# Patient Record
Sex: FEMALE | Race: WHITE | Employment: OTHER | URBAN - METROPOLITAN AREA
[De-identification: names, ages, dates, MRNs, and addresses within clinical notes are randomized per-mention and may not be internally consistent; named-entity substitution may affect disease eponyms.]

---

## 2018-06-07 ENCOUNTER — HOSPITAL PATHOLOGY (OUTPATIENT)
Dept: OTHER | Facility: CLINIC | Age: 33
End: 2018-06-07

## 2018-06-07 ENCOUNTER — HOSPITAL LABORATORY (OUTPATIENT)
Dept: OTHER | Facility: CLINIC | Age: 33
End: 2018-06-07

## 2018-06-09 ENCOUNTER — APPOINTMENT (OUTPATIENT)
Dept: ULTRASOUND IMAGING | Facility: CLINIC | Age: 33
End: 2018-06-09
Attending: EMERGENCY MEDICINE
Payer: COMMERCIAL

## 2018-06-09 ENCOUNTER — HOSPITAL ENCOUNTER (EMERGENCY)
Facility: CLINIC | Age: 33
Discharge: HOME | End: 2018-06-09
Attending: EMERGENCY MEDICINE | Admitting: EMERGENCY MEDICINE
Payer: COMMERCIAL

## 2018-06-09 VITALS
DIASTOLIC BLOOD PRESSURE: 72 MMHG | OXYGEN SATURATION: 100 % | HEART RATE: 78 BPM | HEIGHT: 67 IN | WEIGHT: 184 LBS | RESPIRATION RATE: 16 BRPM | BODY MASS INDEX: 28.88 KG/M2 | TEMPERATURE: 98.7 F | SYSTOLIC BLOOD PRESSURE: 120 MMHG

## 2018-06-09 DIAGNOSIS — I80.9 SUPERFICIAL THROMBOPHLEBITIS, INVOLVING UNSPECIFIED SITE: ICD-10-CM

## 2018-06-09 PROCEDURE — 99284 EMERGENCY DEPT VISIT MOD MDM: CPT | Mod: 25

## 2018-06-09 PROCEDURE — 93971 EXTREMITY STUDY: CPT | Mod: LT

## 2018-06-09 RX ORDER — IBUPROFEN 600 MG/1
600 TABLET, FILM COATED ORAL EVERY 6 HOURS PRN
Qty: 30 TABLET | Refills: 1 | Status: ON HOLD | OUTPATIENT
Start: 2018-06-09 | End: 2019-05-13

## 2018-06-09 ASSESSMENT — ENCOUNTER SYMPTOMS: MYALGIAS: 1

## 2018-06-09 NOTE — DISCHARGE INSTRUCTIONS
Your ultrasound was negative for DVT.  Use warm compresses anti-inflammatories and a low-dose of aspirin.  Please follow-up with primary care return with redness or swelling of the entire calf.

## 2018-06-09 NOTE — ED AVS SNAPSHOT
Emergency Department    [address] [facility] [address]    Phone:  [phone]    Fax:  [phone]                                       Mallika Ojeda   MRN: [id number]    Department:   Emergency Department   Date of Visit:  6/9/2018           After Visit Summary Signature Page     I have received my discharge instructions, and my questions have been answered. I have discussed any challenges I see with this plan with the nurse or doctor.    ..........................................................................................................................................  Patient/Patient Representative Signature      ..........................................................................................................................................  Patient Representative Print Name and Relationship to Patient    ..................................................               ................................................  Date                                            Time    ..........................................................................................................................................  Reviewed by Signature/Title    ...................................................              ..............................................  Date                                                            Time

## 2018-06-09 NOTE — ED PROVIDER NOTES
"  History     Chief Complaint:  Leg Pain (Sent here  by her OB do rule out a DVT in left leg)      HPI   Mallika Ojeda is a 32 year old female who presents with leg pain. The patient had a DNC following a miscarriage on Thursday, two days ago. Since then she developed pain in the left posterior lower leg, near the distal aspect of the calf. The patient called her OB, who sent her here to rule out a DVT in the left leg. The patient has a history of varicose vein removal.      Allergies:  Sulfa Drugs    Medications:    The patient is currently on no regular medications.      Past Medical History:    The patient denies any significant past medical history.    Past Surgical History:    The patient does not have any pertinent past surgical history  Family History:    No past pertinent family history.     Social History:  The patient denies tobacco or alcohol use.  Marital Status:   [2]       Review of Systems   Cardiovascular: Negative for leg swelling.   Musculoskeletal: Positive for myalgias.   All other systems reviewed and are negative.        Physical Exam   First Vitals:  BP: 120/72  Pulse: 78  Temp: 98.7  F (37.1  C)  Resp: 16  Height: 170.2 cm (5' 7\")  Weight: 83.5 kg (184 lb)  SpO2: 100 %      Physical Exam   Constitutional: She appears well-developed.   Cardiovascular: Normal rate.    Pulmonary/Chest: Effort normal.   Musculoskeletal:        Legs:  Nursing note and vitals reviewed.        Emergency Department Course     Imaging:  Radiographic findings were communicated with the patient who voiced understanding of the findings.    US Lower Extremity Venous Duplex, Left:  No evidence of deep venous thrombosis. As per radiology.       Emergency Department Course:  Nursing notes and vitals reviewed.     0901 I performed an exam of the patient as documented above.     The patient was sent for a left lower extremity US while in the emergency department, findings above.     1040 I rechecked the patient and " discussed the results of her workup thus far.     Findings and plan explained to the patient. Patient discharged home with instructions regarding supportive care, medications, and reasons to return. The importance of close follow-up was reviewed. The patient was prescribed aspirin and ibuprofen.    I personally reviewed the laboratory results with the patient and answered all related questions prior to discharge.         Impression & Plan      Medical Decision Making:  Mallika Ojeda is a 32 year old female who presents for evaluation of a tender lump on the left  leg.  This is consistent with superficial thrombophlebitis. I do not think at this point it represents an infected thrombophlebitis.  Discussed warm compresses and aspirin therapy.  Ultrasound is reassuring as there is no DVT.  No signs of pulmonary embolism by history or physical exam.  Patient offered reassurance aspirin and follow-up with primary care.      Diagnosis:    ICD-10-CM   1. Superficial thrombophlebitis, involving unspecified site I80.9       Disposition:  Discharged to home.    Discharge Medications:  New Prescriptions    ASPIRIN 81 MG TABLET    Take 2 tablets (162 mg) by mouth daily for 14 days    IBUPROFEN (ADVIL/MOTRIN) 600 MG TABLET    Take 1 tablet (600 mg) by mouth every 6 hours as needed for moderate pain     I, Yoan Long, am serving as a scribe on 6/9/2018 at 9:26 AM to personally document services performed by Micheal Caputo MD based on my observations and the provider's statements to me.       Yoan Long  6/9/2018    EMERGENCY DEPARTMENT       Micheal Caputo MD  06/14/18 [id number]

## 2018-06-09 NOTE — ED AVS SNAPSHOT
Emergency Department    [address] [facility] [address]    Phone:  [phone]    Fax:  [phone]                                       Mallika Ojeda   MRN: [id number]    Department:   Emergency Department   Date of Visit:  6/9/2018           Patient Information     Date Of Birth          1985        Your diagnoses for this visit were:     Superficial thrombophlebitis, involving unspecified site        You were seen by Micheal Caputo MD.      Follow-up Information     Follow up with Juana Chawla MD In 1 week.    Specialty:  Family Practice    Why:  As needed    Contact information:    [employer]  [address]  [facility] [address] [phone]          Discharge Instructions       Your ultrasound was negative for DVT.  Use warm compresses anti-inflammatories and a low-dose of aspirin.  Please follow-up with primary care return with redness or swelling of the entire calf.    Discharge References/Attachments     THROMBOPHLEBITIS, SUPERFICIAL (ENGLISH)      24 Hour Appointment Hotline       To make an appointment at any [facility], call [phone] ([phone]). If you don't have a family doctor or clinic, we will help you find one. [facility] are conveniently located to serve the needs of you and your family.             Review of your medicines      START taking        Dose / Directions Last dose taken    aspirin 81 MG tablet   Dose:  162 mg   Quantity:  28 tablet        Take 2 tablets (162 mg) by mouth daily for 14 days   Refills:  0        ibuprofen 600 MG tablet   Commonly known as:  ADVIL/MOTRIN   Dose:  600 mg   Quantity:  30 tablet        Take 1 tablet (600 mg) by mouth every 6 hours as needed for moderate pain   Refills:  1                Prescriptions were sent or printed at these locations (2 Prescriptions)                   Other Prescriptions                Printed at Department/Unit printer (2 of 2)         aspirin 81 MG tablet                ibuprofen (ADVIL/MOTRIN) 600 MG tablet                Procedures and tests performed during your visit     US Lower Extremity Venous Duplex Left      Orders Needing Specimen Collection     None      Pending Results     Date and Time Order Name Status Description    6/7/2018 1524 SURGICAL PATHOLOGY EXAM In process             Pending Culture Results     Date and Time Order Name Status Description    6/7/2018 1524 SURGICAL PATHOLOGY EXAM In process             Pending Results Instructions     If you had any lab results that were not finalized at the time of your Discharge, you can call the ED Lab Result RN at [phone]. You will be contacted by this team for any positive Lab results or changes in treatment. The nurses are available 7 days a week from 10A to 6:30P.  You can leave a message 24 hours per day and they will return your call.        Test Results From Your Hospital Stay        6/9/2018 10:46 AM      Narrative     VENOUS ULTRASOUND LEFT LEG  6/9/2018 10:33 AM     HISTORY: Superficial thrombophlebitis. Evaluate for deep venous  thrombosis.      COMPARISON: None.    FINDINGS: Examination of the deep veins with graded compression and  color flow Doppler with spectral wave form analysis shows no evidence  of thrombus in the common femoral vein, femoral vein, popliteal vein  or calf veins. There is no venous insufficiency.        Impression     IMPRESSION: No evidence of deep venous thrombosis.    FRAN NIÑO MD                Clinical Quality Measure: Blood Pressure Screening     Your blood pressure was checked while you were in the emergency department today. The last reading we obtained was  BP: 120/72 . Please read the guidelines below about what these numbers mean and what you should do about them.  If your systolic blood pressure (the top number) is less than 120 and your diastolic blood pressure (the bottom number) is less than 80, then your blood pressure is normal. There is nothing more that  "you need to do about it.  If your systolic blood pressure (the top number) is 120-139 or your diastolic blood pressure (the bottom number) is 80-89, your blood pressure may be higher than it should be. You should have your blood pressure rechecked within a year by a primary care provider.  If your systolic blood pressure (the top number) is 140 or greater or your diastolic blood pressure (the bottom number) is 90 or greater, you may have high blood pressure. High blood pressure is treatable, but if left untreated over time it can put you at risk for heart attack, stroke, or kidney failure. You should have your blood pressure rechecked by a primary care provider within the next 4 weeks.  If your provider in the emergency department today gave you specific instructions to follow-up with your doctor or provider even sooner than that, you should follow that instruction and not wait for up to 4 weeks for your follow-up visit.        Thank you for choosing [facility]       Thank you for choosing [facility] for your care. Our goal is always to provide you with excellent care. Hearing back from our patients is one way we can continue to improve our services. Please take a few minutes to complete the written survey that you may receive in the mail after you visit with us. Thank you!        [employer] Information     [employer] lets you send messages to your doctor, view your test results, renew your prescriptions, schedule appointments and more. To sign up, go to [email or web address] . Click on \"Log in\" on the left side of the screen, which will take you to the Welcome page. Then click on \"Sign up Now\" on the right side of the page.     You will be asked to enter the access code listed below, as well as some personal information. Please follow the directions to create your username and password.     Your access code is: [email or web address]  Expires: 2018 10:55 AM     Your access code will  in 90 days. If you need help or a new " code, please call your [place] clinic or [phone].        Care EveryWhere ID     This is your Care EveryWhere ID. This could be used by other organizations to access your [facility] medical records  [id number]        Equal Access to Services     BUZZ TALBOT : Brea Benton, wamaguida luqadaha, qaybta kaalmada sophie, marce nicholson. So [facility] [phone].    ATENCIÓN: Si habla español, tiene a roblero disposición servicios gratuitos de asistencia lingüística. Llame al [phone].    We comply with applicable federal civil rights laws and [place] laws. We do not discriminate on the basis of race, color, national origin, age, disability, sex, sexual orientation, or gender identity.            After Visit Summary       This is your record. Keep this with you and show to your community pharmacist(s) and doctor(s) at your next visit.

## 2018-06-11 ENCOUNTER — HEALTH MAINTENANCE LETTER (OUTPATIENT)
Age: 33
End: 2018-06-11

## 2018-06-28 LAB
DNA INDEX SPEC FC-ACNC: 1
DNA PLOIDY SPEC FC: NORMAL
PATHOLOGY STUDY: NORMAL
SPECIMEN SOURCE: NORMAL

## 2018-08-16 LAB — COPATH REPORT: NORMAL

## 2018-10-08 LAB
BLD GP AB SCN SERPL QL: NEGATIVE
HBV SURFACE AG SERPL QL IA: NEGATIVE
HIV 1+2 AB+HIV1 P24 AG SERPL QL IA: NORMAL
RUBELLA ABY IGG: NORMAL
RUBELLA ANTIBODY IGG QUANTITATIVE: <0.9 IU/ML
TREPONEMA ANTIBODIES: NORMAL

## 2019-04-08 LAB — GROUP B STREP PCR: POSITIVE

## 2019-05-13 ENCOUNTER — HOSPITAL ENCOUNTER (INPATIENT)
Facility: CLINIC | Age: 34
LOS: 3 days | Discharge: HOME | End: 2019-05-16
Attending: OBSTETRICS & GYNECOLOGY | Admitting: OBSTETRICS & GYNECOLOGY
Payer: COMMERCIAL

## 2019-05-13 DIAGNOSIS — K64.0 GRADE I HEMORRHOIDS: Primary | ICD-10-CM

## 2019-05-13 LAB
ABO + RH BLD: NORMAL
ABO + RH BLD: NORMAL
SPECIMEN EXP DATE BLD: NORMAL

## 2019-05-13 PROCEDURE — 86901 BLOOD TYPING SEROLOGIC RH(D): CPT | Performed by: OBSTETRICS & GYNECOLOGY

## 2019-05-13 PROCEDURE — 3E033VJ INTRODUCTION OF OTHER HORMONE INTO PERIPHERAL VEIN, PERCUTANEOUS APPROACH: ICD-10-PCS | Performed by: OBSTETRICS & GYNECOLOGY

## 2019-05-13 PROCEDURE — 86780 TREPONEMA PALLIDUM: CPT | Performed by: OBSTETRICS & GYNECOLOGY

## 2019-05-13 PROCEDURE — 86900 BLOOD TYPING SEROLOGIC ABO: CPT | Performed by: OBSTETRICS & GYNECOLOGY

## 2019-05-13 PROCEDURE — 36415 COLL VENOUS BLD VENIPUNCTURE: CPT | Performed by: OBSTETRICS & GYNECOLOGY

## 2019-05-13 PROCEDURE — 25000125 ZZHC RX 250: Performed by: OBSTETRICS & GYNECOLOGY

## 2019-05-13 PROCEDURE — 25800030 ZZH RX IP 258 OP 636: Performed by: OBSTETRICS & GYNECOLOGY

## 2019-05-13 PROCEDURE — 12000000 ZZH R&B MED SURG/OB

## 2019-05-13 RX ORDER — OXYTOCIN 10 [USP'U]/ML
10 INJECTION, SOLUTION INTRAMUSCULAR; INTRAVENOUS
Status: DISCONTINUED | OUTPATIENT
Start: 2019-05-13 | End: 2019-05-14

## 2019-05-13 RX ORDER — PENICILLIN G POTASSIUM 5000000 [IU]/1
5 INJECTION, POWDER, FOR SOLUTION INTRAMUSCULAR; INTRAVENOUS ONCE
Status: COMPLETED | OUTPATIENT
Start: 2019-05-13 | End: 2019-05-14

## 2019-05-13 RX ORDER — CLINDAMYCIN PHOSPHATE 900 MG/50ML
900 INJECTION, SOLUTION INTRAVENOUS EVERY 8 HOURS
Status: DISCONTINUED | OUTPATIENT
Start: 2019-05-14 | End: 2019-05-13

## 2019-05-13 RX ORDER — LIDOCAINE 40 MG/G
CREAM TOPICAL
Status: DISCONTINUED | OUTPATIENT
Start: 2019-05-13 | End: 2019-05-14

## 2019-05-13 RX ORDER — OXYCODONE AND ACETAMINOPHEN 5; 325 MG/1; MG/1
1 TABLET ORAL
Status: DISCONTINUED | OUTPATIENT
Start: 2019-05-13 | End: 2019-05-14

## 2019-05-13 RX ORDER — TERBUTALINE SULFATE 1 MG/ML
0.25 INJECTION, SOLUTION SUBCUTANEOUS
Status: DISCONTINUED | OUTPATIENT
Start: 2019-05-13 | End: 2019-05-13

## 2019-05-13 RX ORDER — SODIUM CHLORIDE, SODIUM LACTATE, POTASSIUM CHLORIDE, CALCIUM CHLORIDE 600; 310; 30; 20 MG/100ML; MG/100ML; MG/100ML; MG/100ML
INJECTION, SOLUTION INTRAVENOUS CONTINUOUS
Status: DISCONTINUED | OUTPATIENT
Start: 2019-05-13 | End: 2019-05-14

## 2019-05-13 RX ORDER — ONDANSETRON 2 MG/ML
4 INJECTION INTRAMUSCULAR; INTRAVENOUS EVERY 6 HOURS PRN
Status: DISCONTINUED | OUTPATIENT
Start: 2019-05-13 | End: 2019-05-14

## 2019-05-13 RX ORDER — NALOXONE HYDROCHLORIDE 0.4 MG/ML
.1-.4 INJECTION, SOLUTION INTRAMUSCULAR; INTRAVENOUS; SUBCUTANEOUS
Status: DISCONTINUED | OUTPATIENT
Start: 2019-05-13 | End: 2019-05-14

## 2019-05-13 RX ORDER — IBUPROFEN 400 MG/1
800 TABLET, FILM COATED ORAL
Status: DISCONTINUED | OUTPATIENT
Start: 2019-05-13 | End: 2019-05-14

## 2019-05-13 RX ORDER — METHYLERGONOVINE MALEATE 0.2 MG/ML
200 INJECTION INTRAVENOUS
Status: DISCONTINUED | OUTPATIENT
Start: 2019-05-13 | End: 2019-05-14

## 2019-05-13 RX ORDER — OXYTOCIN/0.9 % SODIUM CHLORIDE 30/500 ML
100-340 PLASTIC BAG, INJECTION (ML) INTRAVENOUS CONTINUOUS PRN
Status: COMPLETED | OUTPATIENT
Start: 2019-05-13 | End: 2019-05-14

## 2019-05-13 RX ORDER — FENTANYL CITRATE 50 UG/ML
50-100 INJECTION, SOLUTION INTRAMUSCULAR; INTRAVENOUS
Status: DISCONTINUED | OUTPATIENT
Start: 2019-05-13 | End: 2019-05-14

## 2019-05-13 RX ORDER — OXYTOCIN/0.9 % SODIUM CHLORIDE 30/500 ML
1-24 PLASTIC BAG, INJECTION (ML) INTRAVENOUS CONTINUOUS
Status: DISCONTINUED | OUTPATIENT
Start: 2019-05-13 | End: 2019-05-14

## 2019-05-13 RX ORDER — VITAMIN A ACETATE, .BETA.-CAROTENE, ASCORBIC ACID, CHOLECALCIFEROL, .ALPHA.-TOCOPHEROL ACETATE, DL-, THIAMINE MONONITRATE, RIBOFLAVIN, NIACINAMIDE, PYRIDOXINE HYDROCHLORIDE, FOLIC ACID, CYANOCOBALAMIN, CALCIUM CARBONATE, FERROUS FUMARATE, ZINC OXIDE, AND CUPRIC OXIDE 2000; 2000; 120; 400; 22; 1.84; 3; 20; 10; 1; 12; 200; 27; 25; 2 [IU]/1; [IU]/1; MG/1; [IU]/1; MG/1; MG/1; MG/1; MG/1; MG/1; MG/1; UG/1; MG/1; MG/1; MG/1; MG/1
1 TABLET ORAL DAILY
Status: ON HOLD | COMMUNITY
End: 2020-12-27

## 2019-05-13 RX ORDER — CARBOPROST TROMETHAMINE 250 UG/ML
250 INJECTION, SOLUTION INTRAMUSCULAR
Status: DISCONTINUED | OUTPATIENT
Start: 2019-05-13 | End: 2019-05-14

## 2019-05-13 RX ORDER — ACETAMINOPHEN 325 MG/1
650 TABLET ORAL EVERY 4 HOURS PRN
Status: DISCONTINUED | OUTPATIENT
Start: 2019-05-13 | End: 2019-05-14

## 2019-05-13 RX ADMIN — SODIUM CHLORIDE, POTASSIUM CHLORIDE, SODIUM LACTATE AND CALCIUM CHLORIDE: 600; 310; 30; 20 INJECTION, SOLUTION INTRAVENOUS at 18:33

## 2019-05-13 RX ADMIN — OXYTOCIN-SODIUM CHLORIDE 0.9% IV SOLN 30 UNIT/500ML 2 MILLI-UNITS/MIN: 30-0.9/5 SOLUTION at 18:40

## 2019-05-13 RX ADMIN — SODIUM CHLORIDE, POTASSIUM CHLORIDE, SODIUM LACTATE AND CALCIUM CHLORIDE: 600; 310; 30; 20 INJECTION, SOLUTION INTRAVENOUS at 23:57

## 2019-05-13 ASSESSMENT — MIFFLIN-ST. JEOR: SCORE: 1704.26

## 2019-05-13 NOTE — H&P
May 13, 2019      32yo  @ 41.0 wga here for post-dates IOL.  Prenatal care has been uncomplicated other than low-lying placenta that resolved on 28 wk US, rubella non-immune, and GBS positive.  There have been no changes in her medical history based upon review of chart, nursing records, and examination.      PNLs: A pos, ABS neg, rubella non-immune, HIV NR, HBsAg neg, RPR NR, GBS POS      There were no vitals taken for this visit.  Gen: NAD, A&O x 3  Abd: gravid, NT, palpated vertex  SVE: closed>> Using a sterile speculum, cervix was visualized and cleaned with betadine.  A Cooks catheter cervical ripening balloon was placed under direct visualization without difficulty. The most distal uterine balloon was inflated with 40 ml of NS and was confirmed supracervical.  The vaginal balloon was inflated with 40 ml of NS.  Pt tolerated placement well.  FHT: cat 1 reactive, baseline 120  TOCO: irregular      A/P: 32yo  @ 41.0 wga here for post-dates IOL.  AVSS.  - unfavorable cervix: Cooks catheter cervical ripening balloon placed without difficulty.  Will combine with low dose pitocin overnight, plan to remove tomorrow ~ 6 AM with likely pitocin per protocol afterwards  - GBS positive, PCN when in labor  - EFW 8 lb        KATHY SHARMA MD

## 2019-05-14 ENCOUNTER — ANESTHESIA EVENT (OUTPATIENT)
Dept: OBGYN | Facility: CLINIC | Age: 34
End: 2019-05-14
Payer: COMMERCIAL

## 2019-05-14 ENCOUNTER — ANESTHESIA (OUTPATIENT)
Dept: OBGYN | Facility: CLINIC | Age: 34
End: 2019-05-14
Payer: COMMERCIAL

## 2019-05-14 LAB — T PALLIDUM AB SER QL: NONREACTIVE

## 2019-05-14 PROCEDURE — 25000128 H RX IP 250 OP 636: Performed by: ANESTHESIOLOGY

## 2019-05-14 PROCEDURE — 37000011 ZZH ANESTHESIA WARD SERVICE

## 2019-05-14 PROCEDURE — 88307 TISSUE EXAM BY PATHOLOGIST: CPT | Mod: 26 | Performed by: OBSTETRICS & GYNECOLOGY

## 2019-05-14 PROCEDURE — 12000035 ZZH R&B POSTPARTUM

## 2019-05-14 PROCEDURE — 88307 TISSUE EXAM BY PATHOLOGIST: CPT | Performed by: OBSTETRICS & GYNECOLOGY

## 2019-05-14 PROCEDURE — 3E0R3BZ INTRODUCTION OF ANESTHETIC AGENT INTO SPINAL CANAL, PERCUTANEOUS APPROACH: ICD-10-PCS | Performed by: ANESTHESIOLOGY

## 2019-05-14 PROCEDURE — 25000128 H RX IP 250 OP 636: Performed by: OBSTETRICS & GYNECOLOGY

## 2019-05-14 PROCEDURE — 25000125 ZZHC RX 250: Performed by: ANESTHESIOLOGY

## 2019-05-14 PROCEDURE — 25800030 ZZH RX IP 258 OP 636: Performed by: OBSTETRICS & GYNECOLOGY

## 2019-05-14 PROCEDURE — 25000125 ZZHC RX 250: Performed by: OBSTETRICS & GYNECOLOGY

## 2019-05-14 PROCEDURE — 0KQM0ZZ REPAIR PERINEUM MUSCLE, OPEN APPROACH: ICD-10-PCS | Performed by: OBSTETRICS & GYNECOLOGY

## 2019-05-14 PROCEDURE — 25000132 ZZH RX MED GY IP 250 OP 250 PS 637: Performed by: OBSTETRICS & GYNECOLOGY

## 2019-05-14 PROCEDURE — 27110038 ZZH RX 271: Performed by: ANESTHESIOLOGY

## 2019-05-14 PROCEDURE — 25000132 ZZH RX MED GY IP 250 OP 250 PS 637

## 2019-05-14 PROCEDURE — 00HU33Z INSERTION OF INFUSION DEVICE INTO SPINAL CANAL, PERCUTANEOUS APPROACH: ICD-10-PCS | Performed by: ANESTHESIOLOGY

## 2019-05-14 PROCEDURE — 72200001 ZZH LABOR CARE VAGINAL DELIVERY SINGLE

## 2019-05-14 RX ORDER — NALOXONE HYDROCHLORIDE 0.4 MG/ML
.1-.4 INJECTION, SOLUTION INTRAMUSCULAR; INTRAVENOUS; SUBCUTANEOUS
Status: DISCONTINUED | OUTPATIENT
Start: 2019-05-14 | End: 2019-05-16 | Stop reason: HOSPADM

## 2019-05-14 RX ORDER — OXYTOCIN 10 [USP'U]/ML
10 INJECTION, SOLUTION INTRAMUSCULAR; INTRAVENOUS
Status: DISCONTINUED | OUTPATIENT
Start: 2019-05-14 | End: 2019-05-16 | Stop reason: HOSPADM

## 2019-05-14 RX ORDER — ACETAMINOPHEN 325 MG/1
975 TABLET ORAL EVERY 6 HOURS
Status: DISCONTINUED | OUTPATIENT
Start: 2019-05-14 | End: 2019-05-14

## 2019-05-14 RX ORDER — OXYTOCIN/0.9 % SODIUM CHLORIDE 30/500 ML
340 PLASTIC BAG, INJECTION (ML) INTRAVENOUS CONTINUOUS PRN
Status: DISCONTINUED | OUTPATIENT
Start: 2019-05-14 | End: 2019-05-16 | Stop reason: HOSPADM

## 2019-05-14 RX ORDER — OXYTOCIN/0.9 % SODIUM CHLORIDE 30/500 ML
100 PLASTIC BAG, INJECTION (ML) INTRAVENOUS CONTINUOUS
Status: DISCONTINUED | OUTPATIENT
Start: 2019-05-14 | End: 2019-05-16 | Stop reason: HOSPADM

## 2019-05-14 RX ORDER — NALOXONE HYDROCHLORIDE 0.4 MG/ML
.1-.4 INJECTION, SOLUTION INTRAMUSCULAR; INTRAVENOUS; SUBCUTANEOUS
Status: DISCONTINUED | OUTPATIENT
Start: 2019-05-14 | End: 2019-05-14

## 2019-05-14 RX ORDER — MISOPROSTOL 200 UG/1
800 TABLET ORAL
Status: COMPLETED | OUTPATIENT
Start: 2019-05-14 | End: 2019-05-14

## 2019-05-14 RX ORDER — LANOLIN 100 %
OINTMENT (GRAM) TOPICAL
Status: DISCONTINUED | OUTPATIENT
Start: 2019-05-14 | End: 2019-05-16 | Stop reason: HOSPADM

## 2019-05-14 RX ORDER — AMPICILLIN 2 G/1
2 INJECTION, POWDER, FOR SOLUTION INTRAVENOUS EVERY 6 HOURS
Status: DISCONTINUED | OUTPATIENT
Start: 2019-05-14 | End: 2019-05-14

## 2019-05-14 RX ORDER — EPHEDRINE SULFATE 50 MG/ML
5 INJECTION, SOLUTION INTRAMUSCULAR; INTRAVENOUS; SUBCUTANEOUS
Status: DISCONTINUED | OUTPATIENT
Start: 2019-05-14 | End: 2019-05-14

## 2019-05-14 RX ORDER — HYDROCORTISONE 2.5 %
CREAM (GRAM) TOPICAL 3 TIMES DAILY PRN
Status: DISCONTINUED | OUTPATIENT
Start: 2019-05-14 | End: 2019-05-16 | Stop reason: HOSPADM

## 2019-05-14 RX ORDER — ACETAMINOPHEN 325 MG/1
TABLET ORAL
Status: COMPLETED
Start: 2019-05-14 | End: 2019-05-14

## 2019-05-14 RX ORDER — LIDOCAINE HYDROCHLORIDE AND EPINEPHRINE 15; 5 MG/ML; UG/ML
INJECTION, SOLUTION EPIDURAL PRN
Status: DISCONTINUED | OUTPATIENT
Start: 2019-05-14 | End: 2019-05-21 | Stop reason: HOSPADM

## 2019-05-14 RX ORDER — METHYLERGONOVINE MALEATE 0.2 MG/ML
200 INJECTION INTRAVENOUS
Status: COMPLETED | OUTPATIENT
Start: 2019-05-14 | End: 2019-05-14

## 2019-05-14 RX ORDER — GENTAMICIN SULFATE 60 MG/50ML
1.5 INJECTION, SOLUTION INTRAVENOUS EVERY 8 HOURS
Status: DISCONTINUED | OUTPATIENT
Start: 2019-05-15 | End: 2019-05-14

## 2019-05-14 RX ORDER — ROPIVACAINE HYDROCHLORIDE 2 MG/ML
INJECTION, SOLUTION EPIDURAL; INFILTRATION; PERINEURAL PRN
Status: DISCONTINUED | OUTPATIENT
Start: 2019-05-14 | End: 2019-05-21 | Stop reason: HOSPADM

## 2019-05-14 RX ORDER — AMOXICILLIN 250 MG
1 CAPSULE ORAL 2 TIMES DAILY
Status: DISCONTINUED | OUTPATIENT
Start: 2019-05-14 | End: 2019-05-16 | Stop reason: HOSPADM

## 2019-05-14 RX ORDER — IBUPROFEN 400 MG/1
800 TABLET, FILM COATED ORAL EVERY 6 HOURS PRN
Status: DISCONTINUED | OUTPATIENT
Start: 2019-05-14 | End: 2019-05-16 | Stop reason: HOSPADM

## 2019-05-14 RX ORDER — BISACODYL 10 MG
10 SUPPOSITORY, RECTAL RECTAL DAILY PRN
Status: DISCONTINUED | OUTPATIENT
Start: 2019-05-16 | End: 2019-05-16 | Stop reason: HOSPADM

## 2019-05-14 RX ORDER — NALBUPHINE HYDROCHLORIDE 10 MG/ML
2.5-5 INJECTION, SOLUTION INTRAMUSCULAR; INTRAVENOUS; SUBCUTANEOUS EVERY 6 HOURS PRN
Status: DISCONTINUED | OUTPATIENT
Start: 2019-05-14 | End: 2019-05-14

## 2019-05-14 RX ORDER — ACETAMINOPHEN 325 MG/1
650 TABLET ORAL EVERY 4 HOURS PRN
Status: DISCONTINUED | OUTPATIENT
Start: 2019-05-14 | End: 2019-05-16 | Stop reason: HOSPADM

## 2019-05-14 RX ORDER — AMOXICILLIN 250 MG
2 CAPSULE ORAL 2 TIMES DAILY
Status: DISCONTINUED | OUTPATIENT
Start: 2019-05-14 | End: 2019-05-16 | Stop reason: HOSPADM

## 2019-05-14 RX ADMIN — SODIUM CHLORIDE, POTASSIUM CHLORIDE, SODIUM LACTATE AND CALCIUM CHLORIDE: 600; 310; 30; 20 INJECTION, SOLUTION INTRAVENOUS at 09:02

## 2019-05-14 RX ADMIN — SODIUM CHLORIDE, POTASSIUM CHLORIDE, SODIUM LACTATE AND CALCIUM CHLORIDE: 600; 310; 30; 20 INJECTION, SOLUTION INTRAVENOUS at 13:16

## 2019-05-14 RX ADMIN — ACETAMINOPHEN 975 MG: 325 TABLET ORAL at 17:15

## 2019-05-14 RX ADMIN — SODIUM CHLORIDE, POTASSIUM CHLORIDE, SODIUM LACTATE AND CALCIUM CHLORIDE: 600; 310; 30; 20 INJECTION, SOLUTION INTRAVENOUS at 09:57

## 2019-05-14 RX ADMIN — GENTAMICIN SULFATE 160 MG: 40 INJECTION, SOLUTION INTRAMUSCULAR; INTRAVENOUS at 17:45

## 2019-05-14 RX ADMIN — LIDOCAINE HYDROCHLORIDE,EPINEPHRINE BITARTRATE 3 ML: 15; .005 INJECTION, SOLUTION EPIDURAL; INFILTRATION; INTRACAUDAL; PERINEURAL at 10:18

## 2019-05-14 RX ADMIN — MISOPROSTOL 800 MCG: 200 TABLET ORAL at 20:03

## 2019-05-14 RX ADMIN — SODIUM CHLORIDE 2.5 MILLION UNITS: 9 INJECTION, SOLUTION INTRAVENOUS at 09:57

## 2019-05-14 RX ADMIN — SODIUM CHLORIDE 2.5 MILLION UNITS: 9 INJECTION, SOLUTION INTRAVENOUS at 14:05

## 2019-05-14 RX ADMIN — IBUPROFEN 800 MG: 400 TABLET ORAL at 21:53

## 2019-05-14 RX ADMIN — Medication 12 ML/HR: at 10:30

## 2019-05-14 RX ADMIN — AMPICILLIN SODIUM 2 G: 2 INJECTION, POWDER, FOR SOLUTION INTRAVENOUS at 17:20

## 2019-05-14 RX ADMIN — PENICILLIN G POTASSIUM 5 MILLION UNITS: 5000000 POWDER, FOR SOLUTION INTRAMUSCULAR; INTRAPLEURAL; INTRATHECAL; INTRAVENOUS at 06:15

## 2019-05-14 RX ADMIN — OXYTOCIN-SODIUM CHLORIDE 0.9% IV SOLN 30 UNIT/500ML 340 ML/HR: 30-0.9/5 SOLUTION at 18:19

## 2019-05-14 RX ADMIN — METHYLERGONOVINE MALEATE 200 MCG: 0.2 INJECTION INTRAVENOUS at 19:19

## 2019-05-14 RX ADMIN — ACETAMINOPHEN 975 MG: 325 TABLET, FILM COATED ORAL at 17:15

## 2019-05-14 RX ADMIN — ROPIVACAINE HYDROCHLORIDE 10 ML: 2 INJECTION, SOLUTION EPIDURAL; INFILTRATION at 10:20

## 2019-05-14 RX ADMIN — OXYTOCIN-SODIUM CHLORIDE 0.9% IV SOLN 30 UNIT/500ML 100 ML/HR: 30-0.9/5 SOLUTION at 19:35

## 2019-05-14 RX ADMIN — ONDANSETRON 4 MG: 2 INJECTION INTRAMUSCULAR; INTRAVENOUS at 13:13

## 2019-05-14 NOTE — PLAN OF CARE
Progress note:    Report received from Kaylan BALL RN and Giulia WEINER RN.  VSS.  Cooks catheter in place.  Pitocin infusing, see MAR.  FHM showing moderate variability with accelerations present.  Cooks catheter removed at 0600.  Started on IV antibiotics.  Patient and spouse in agreement with plan of care.  Will continue to monitor.

## 2019-05-14 NOTE — PLAN OF CARE
Patient admitted to Labor and Delivery at 41.0 weeks GA for induction for postdates with use of cook catheter. Patient consents to use of external fetal and uterine monitors. Plan of care discussed and patient verbalizes understanding.

## 2019-05-14 NOTE — ANESTHESIA PREPROCEDURE EVALUATION
"Anesthesia Pre-Procedure Evaluation    Patient: Mallika Ojeda   MRN: [id number] : 1985          Preoperative Diagnosis: * No surgery found *        Past Medical History:   Diagnosis Date     Migraine      Varicose vein of leg      Past Surgical History:   Procedure Laterality Date     DILATION AND CURETTAGE       LIGATE VEIN      age 21       Anesthesia Evaluation       history and physical reviewed .      No history of anesthetic complications          ROS/MED HX    ENT/Pulmonary:  - neg pulmonary ROS     Neurologic:  - neg neurologic ROS     Cardiovascular:  - neg cardiovascular ROS       METS/Exercise Tolerance:     Hematologic:         Musculoskeletal:         GI/Hepatic:  - neg GI/hepatic ROS       Renal/Genitourinary:         Endo:         Psychiatric:         Infectious Disease:         Malignancy:         Other:                     neg OB ROS            Physical Exam  Normal systems: cardiovascular, pulmonary and dental    Airway   Mallampati: II  TM distance: > 3 FB  Neck ROM: full  Mouth opening: > 3 cm    Dental     Cardiovascular       Pulmonary             No results found for: WBC, HGB, HCT, PLT, CRP, SED, NA, POTASSIUM, CHLORIDE, CO2, BUN, CR, GLC, MARION, PHOS, MAG, ALBUMIN, PROTTOTAL, ALT, AST, GGT, ALKPHOS, BILITOTAL, BILIDIRECT, LIPASE, AMYLASE, PIA, PTT, INR, FIBR, TSH, T4, T3, HCG, HCGS, CKTOTAL, CKMB, TROPN    Preop Vitals  BP Readings from Last 3 Encounters:   19 108/64   18 120/72    Pulse Readings from Last 3 Encounters:   18 78      Resp Readings from Last 3 Encounters:   19 (P) 16   18 16    SpO2 Readings from Last 3 Encounters:   19 100%   18 100%      Temp Readings from Last 1 Encounters:   19 (P) 36.6  C (97.8  F) ((P) Temporal)    Ht Readings from Last 1 Encounters:   19 1.702 m (5' 7\")      Wt Readings from Last 1 Encounters:   19 96.2 kg (212 lb)    Estimated body mass index is 33.2 kg/m  as calculated from the " "following:    Height as of this encounter: 1.702 m (5' 7\").    Weight as of this encounter: 96.2 kg (212 lb).       Anesthesia Plan      History & Physical Review      ASA Status:  2 .  OB Epidural Asa: 2       Plan for Epidural          Postoperative Care      Consents  Anesthetic plan, risks, benefits and alternatives discussed with:  Patient..                 Maldonado Pastrana MD  "

## 2019-05-14 NOTE — ANESTHESIA PROCEDURE NOTES
Peripheral nerve/Neuraxial procedure note : epidural catheter  Pre-Procedure  Performed by Maldonado Pastrana MD  Location: OB      Pre-Anesthestic Checklist: patient identified, IV checked, risks and benefits discussed, informed consent, monitors and equipment checked and pre-op evaluation    Timeout  Correct Patient: Yes   Correct Procedure: Yes   Correct Site: Yes     Correct Position: Yes     .   Procedure Documentation    .    Procedure:    Epidural catheter.  Insertion Site:L3-4  (midline approach) Injection technique: LORT saline   Local skin infiltrated with 3 mL of 2% lidocaine.  TRACEY at 5 cm     Patient Prep;mask, sterile gloves, povidone-iodine 7.5% surgical scrub, patient draped.  .  Needle: Touhy needle Needle Gauge: 17.    Needle Length (Inches) 3.5  # of attempts: 1 and # of redirects:  .   Catheter: 19 G . .  Catheter threaded easily  3 cm epidural space.  8 cm at skin.   .    Assessment/Narrative  Paresthesias: No.  .  .  Aspiration negative for heme or CSF  . Test dose of 3 mL lidocaine 1.5% w/ 1:200,000 epinephrine at. Test dose negative for signs of intravascular, subdural or intrathecal injection.

## 2019-05-14 NOTE — PROGRESS NOTES
"Comfortable. Balloon removed this AM.  /74   Temp 98.4  F (36.9  C) (Temporal)   Resp 18   Ht 1.702 m (5' 7\")   Wt 96.2 kg (212 lb)   BMI 33.20 kg/m     [place]: q3  FHT 130s mod variability, no decels, +accels  SVE 4/80/-2 AROM clear vs. mec    A/P: 33 year old  @ 41w1d for postdates IOL  FWB Category 1  Pitocin  CHIQUITA prn    Clau N. Cho   "

## 2019-05-14 NOTE — L&D DELIVERY NOTE
May 14, 2019         of viable male infant in TRUDY presentation, no nuchal cord and shoulders did delivery without difficulty.  Apgars 9/9, 10 lb 8 oz.  Possible mec stained fluid and terminal mec noted during delivery.  Baby meets LGA criteria and also pt diagnosed with intraamniotic infection during 2nd stage based upon 2 maternal fevers >30 min apart with fetal tachycardia. Pt was started on amp/gent.  She was on PCN throughout the day for +GBS.  Please see dictated delivery note for further details.      KATHY SHARMA MD

## 2019-05-14 NOTE — PLAN OF CARE
Patient is comfortable sleeping on her right side. FHT have remained in a normal range category 1 tracing throughout shift. Patient does not report pain with contractions. All questions and concerns have been addressed and patient is calm and cooperative.

## 2019-05-14 NOTE — PLAN OF CARE
Dr MERLE Jaramillo at bedside. sue of care reviewed with pt and . AROM performed. Unable to determine color at this time due to scant fluid.

## 2019-05-14 NOTE — PROGRESS NOTES
[place] second stage, temp up to 101, then 102, 30 minutes apart, FHT now in 160's. Dx for chorioamnionitis made. Pt started on ampicillin and gentamicin, NICU notified. Pt has been pushing 2.5 hours and contniues to make good progress, vertex at +3/5 station. Will continue to push.    Clau Jaramillo

## 2019-05-15 LAB — HGB BLD-MCNC: 12.2 G/DL (ref 11.7–15.7)

## 2019-05-15 PROCEDURE — 25000132 ZZH RX MED GY IP 250 OP 250 PS 637: Performed by: OBSTETRICS & GYNECOLOGY

## 2019-05-15 PROCEDURE — 25000128 H RX IP 250 OP 636: Performed by: OBSTETRICS & GYNECOLOGY

## 2019-05-15 PROCEDURE — 12000035 ZZH R&B POSTPARTUM

## 2019-05-15 PROCEDURE — 85018 HEMOGLOBIN: CPT | Performed by: OBSTETRICS & GYNECOLOGY

## 2019-05-15 PROCEDURE — 36415 COLL VENOUS BLD VENIPUNCTURE: CPT | Performed by: OBSTETRICS & GYNECOLOGY

## 2019-05-15 PROCEDURE — 90707 MMR VACCINE SC: CPT | Performed by: OBSTETRICS & GYNECOLOGY

## 2019-05-15 RX ADMIN — MEASLES, MUMPS, AND RUBELLA VIRUS VACCINE LIVE 0.5 ML: 1000; 12500; 1000 INJECTION, POWDER, LYOPHILIZED, FOR SUSPENSION SUBCUTANEOUS at 21:20

## 2019-05-15 RX ADMIN — IBUPROFEN 800 MG: 400 TABLET ORAL at 21:12

## 2019-05-15 RX ADMIN — IBUPROFEN 800 MG: 400 TABLET ORAL at 11:28

## 2019-05-15 RX ADMIN — ACETAMINOPHEN 650 MG: 325 TABLET, FILM COATED ORAL at 08:16

## 2019-05-15 RX ADMIN — ACETAMINOPHEN 650 MG: 325 TABLET, FILM COATED ORAL at 21:11

## 2019-05-15 RX ADMIN — ACETAMINOPHEN 650 MG: 325 TABLET, FILM COATED ORAL at 01:41

## 2019-05-15 RX ADMIN — IBUPROFEN 800 MG: 400 TABLET ORAL at 04:05

## 2019-05-15 RX ADMIN — ACETAMINOPHEN 650 MG: 325 TABLET, FILM COATED ORAL at 15:29

## 2019-05-15 RX ADMIN — SENNOSIDES AND DOCUSATE SODIUM 1 TABLET: 8.6; 5 TABLET ORAL at 08:16

## 2019-05-15 RX ADMIN — SENNOSIDES AND DOCUSATE SODIUM 2 TABLET: 8.6; 5 TABLET ORAL at 21:12

## 2019-05-15 NOTE — PROGRESS NOTES
"PPD1   Feels well, notes tailbone hurts with pressure on it  /73   Pulse 68   Temp 98.9  F (37.2  C) (Oral)   Resp 16   Ht 1.702 m (5' 7\")   Wt 96.2 kg (212 lb)   SpO2 100%   Breastfeeding? Unknown   BMI 33.20 kg/m    NAD  Abd Soft, NT, FF    PPD 1 s/p , chorio, baby in NICU for antibiotics  - Disc mgmt of tailbone pain with tylenol, ibuprofen, using pillow when sitting in chair.   - Routine care, likely discharge tomorrow    Clau Jaramillo   "

## 2019-05-15 NOTE — PLAN OF CARE
Vital signs stable, fundus is firm, U/U, light to moderate rubra flow.Patient is up ad delicia, voiding, pain well controlled .Encouraged to continue to ambulate as able and void frequently. Infant in NICU. Report given to Aggie Atwood RN

## 2019-05-15 NOTE — PLAN OF CARE
VSS.  Pain well controlled with tylenol and ibuprofen, ice and tucks.  Up independently in room.  Going to NICU every 3 hours to feed. Pumping Q3H. Progressing per care plan. Continue to monitor and notify MD as needed.

## 2019-05-15 NOTE — PLAN OF CARE
VSS.  Pain well controlled with tylenol and ibuprofen. Up independently in room.   wheeled PT down to NICU to breastfeed baby. Encouraged Pt to pump after each breastfeeding attempted. Continue to monitor and notify MD as needed.

## 2019-05-15 NOTE — PLAN OF CARE
Patient arrived to room at 2135. Sister present. Received report from Meena LOVE. Patient and family oriented to room and floor.  Call light within reach. NICU number given to patient.  Questions/concerns addressed.

## 2019-05-15 NOTE — L&D DELIVERY NOTE
Delivery Date:  05/14/2019      DATE OF DELIVERY:  05/14/2019.       DELIVERY SUMMARY:  The patient is a 33-year-old G2, P0-0-1-0, who presented to Labor and Delivery at 41 weeks of gestation on the evening of 05/13/2019, for a post-dates induction of labor.  Of note, her prenatal care had been uncomplicated, other than a history of low-lying placenta, which had resolved on a 28-week ultrasound.  Rubella nonimmune status, as well as GBS positivity.  Upon arrival to Labor and Delivery, her cervix was found to be closed and 50% effaced and -3 station.  A Cooks catheter cervical ripening balloon was placed without difficulty, which was left in overnight with low-dose Pitocin at 2 milliunits per minute.  The following morning after 12 hours, the Cooks catheter was subsequently removed, and she was found to be approximately 2 cm dilated.  She was initiated on Pitocin, per protocol, as well as penicillin for GBS positivity.  She continued to make good cervical change.  At approximately 10 a.m., she underwent epidural placement with adequate pain relief.  At 1415, she was found to be completely dilated, but comfortable.  She labored down for an hour and at 1510, she started pushing.  Of note, during second stage of labor, the patient did spike a fever of 101 degrees Fahrenheit.  Repeat temperature half an hour later was 102 degrees Fahrenheit and fetal heart tones were noted to be in the 160s for a baseline, consistent with fetal tachycardia.  Due to this concern for intra-amniotic infection, the patient was started on ampicillin and gentamicin, per protocol.  NICU was also notified.  At 1813 p.m., the patient subsequently delivered a viable male infant in right occiput anterior presentation.  There was no nuchal cord and shoulders delivered without difficulty.  Of note, fluid at this time during delivery of the infant, appeared to be mixed stained with terminal meconium as well.  The infant was placed on the maternal  abdomen and was bulb suctioned and the cord was clamped and cut after 1 minute.  Cord blood was then obtained.  Infant Apgars, of note, were 9 and 9.      Placenta then delivered spontaneously intact with a 3-vessel cord.  Uterus became firm with fundal massage and IV Pitocin.  Inspection revealed a second-degree perineal laceration, which was repaired with 2-0 Vicryl suture in a standard fashion.  Reinspection revealed excellent hemostasis.  There was a superficial abrasion right at the periurethral area; however, this was hemostatic and did not require repair.  Inspection of the remainder of the vagina, as well as cervix revealed no further lacerations.  Uterus remained firm and there was a slight trickle of active bleeding noted.  All sponge, instrument counts were correct x 2.  Estimated blood loss approximately 100 mL.  Quantitative blood loss calculation pending.  Infant weight was 10 pounds 8 ounces, which is consistent with LGA criteria.         KATHY SHARMA MD             D: 2019   T: 2019   MT: JO      Name:     ERIC PEREZ   MRN:      [id number]        Account:        [id number]   :      1985        Delivery Date:  2019               Document: [id number]

## 2019-05-15 NOTE — PROGRESS NOTES
May 14, 2019      Notified by RN, pt with light-mod active VB and uterus intermittently felt atonic - she increased pitocin rate.  Assessed patient.  Light active VB with fundal massage.  Pt otherwise feeling well.  Will administer methergine 0.2 mg IM x 1 dose (BPs normal) and hang 2nd bag of pitocin.  Continue to monitor closely.      KATHY SHARMA MD

## 2019-05-15 NOTE — LACTATION NOTE
Initial Lactation visit.  Recommend unlimited, frequent breast feedings: At least 8 - 12 times every 24 hours. Avoid pacifiers and supplementation with formula unless medically indicated. Explained benefits of holding baby skin on skin to help promote better breastfeeding outcomes.   Infant is in NICU.  Working on breast feeding.  Assisted with feeding during visit.  Infant was very fussy and crying at the breast.  Would settle well with green soothie pacifier but not latch to breast.  Attempted to use shield and he latched better but was still fussy and would come off the breast after a few sucks.  Started supplement of donor milk and baby latched and did better.  He was able to take the supplement from syringe and feeding tube with the shield and continued to suck for a couple minutes after supplement was done.  Mallika did really well holding and latching baby.  Encouraged her to attempt to latch without shield initially but if he is fussy and not latching after a couple minutes to use the shield for feeding.  Discussed process of weaning from shield and supplement as milk comes in.  She is pumping and getting drops of colostrum.  Reviewed importance on pumping until milk is in and not needing to supplement.    Mallika was feeling better that he had latched and done some nursing.   Will revisit as needed.    Martina Ortega RN, IBCLC

## 2019-05-16 VITALS
RESPIRATION RATE: 16 BRPM | WEIGHT: 212 LBS | HEIGHT: 67 IN | DIASTOLIC BLOOD PRESSURE: 74 MMHG | SYSTOLIC BLOOD PRESSURE: 113 MMHG | TEMPERATURE: 98.4 F | HEART RATE: 64 BPM | BODY MASS INDEX: 33.27 KG/M2 | OXYGEN SATURATION: 100 %

## 2019-05-16 LAB — COPATH REPORT: NORMAL

## 2019-05-16 PROCEDURE — 25000132 ZZH RX MED GY IP 250 OP 250 PS 637: Performed by: OBSTETRICS & GYNECOLOGY

## 2019-05-16 RX ORDER — IBUPROFEN 800 MG/1
800 TABLET, FILM COATED ORAL EVERY 6 HOURS PRN
Status: ON HOLD | COMMUNITY
Start: 2019-05-16 | End: 2020-12-27

## 2019-05-16 RX ORDER — AMOXICILLIN 250 MG
2 CAPSULE ORAL 2 TIMES DAILY PRN
Status: ON HOLD | COMMUNITY
Start: 2019-05-16 | End: 2020-12-27

## 2019-05-16 RX ORDER — ACETAMINOPHEN 325 MG/1
650 TABLET ORAL EVERY 4 HOURS PRN
Status: ON HOLD | COMMUNITY
Start: 2019-05-16 | End: 2020-12-27

## 2019-05-16 RX ORDER — HYDROCORTISONE 2.5 %
CREAM (GRAM) TOPICAL 3 TIMES DAILY PRN
Status: ON HOLD | COMMUNITY
Start: 2019-05-16 | End: 2020-12-27

## 2019-05-16 RX ORDER — LANOLIN 100 %
OINTMENT (GRAM) TOPICAL
Status: ON HOLD | COMMUNITY
Start: 2019-05-16 | End: 2020-12-27

## 2019-05-16 RX ADMIN — ACETAMINOPHEN 650 MG: 325 TABLET, FILM COATED ORAL at 09:57

## 2019-05-16 RX ADMIN — ACETAMINOPHEN 650 MG: 325 TABLET, FILM COATED ORAL at 03:15

## 2019-05-16 RX ADMIN — IBUPROFEN 800 MG: 400 TABLET ORAL at 03:15

## 2019-05-16 RX ADMIN — IBUPROFEN 800 MG: 400 TABLET ORAL at 09:57

## 2019-05-16 RX ADMIN — SENNOSIDES AND DOCUSATE SODIUM 1 TABLET: 8.6; 5 TABLET ORAL at 09:57

## 2019-05-16 NOTE — PLAN OF CARE
Vitals within defined limits. Fundus firm. Lochia scant. Up ad delicia. Voiding without issues. Using Tylenol/Motrin for perineal soreness with relief, using ice packs and tucks for comfort as well. Visiting infant in NICU. Expressed disappointment that infant is still in NICU, active listening used at bedside. Spouse at bedside and supportive. Will continue to monitor.

## 2019-05-16 NOTE — PROGRESS NOTES
#2 postpartum Induction 41 weeks.VD.    Pt states doing well. Mild tailbone discomfort. Hemorrhoids.  Pain well controlled.   Nursing. Infant in NICU: antibiotics.    Afebrile VSS. HGB 12.2  Fundus firm. Mod flow.  Voiding w/o problems.   Passing flatus.  Ext: trace edema. No pain.    Normal postpartum course.    Plan routine postpartum care.  Discharge to Room and Board if patient desires.  Recheck at [facility] in 6 weeks or prn if problems.

## 2019-05-16 NOTE — LACTATION NOTE
Routine visit. Mallika is discharging home today. Her baby will remain in the NICU for a few more days. She states breastfeeding is improving. She's using a shield occasionally but is worried about not being able to wean from it. Discussed using it now if it works well for feedings and working on weaning from it in a couple weeks. Encouraged Mallika to continue using staff for breastfeeding assistance and continue pumping after feedings until she and her baby are home together and breastfeeding is going well. Mallika and her  appreciative of my visit. Will revisit in NICU as needed.

## 2019-05-16 NOTE — PLAN OF CARE
Patient complains of mild to moderate perineal/rectal discomfort.  Having adequate pain control with tylenol and ibuprofen PO.  Hydro-cortizone cream given for hemorrhoids. Also using ice packs and tucks to perineum for comfort.  Patient states her nipples are tender and bleeding.  Hydrogels given to alternate with lanolin and sore nipple shells.  Lactation following.  Patient states she has breast pump for home.  Discharge instructions explained and given to patient and spouse and all questions/concerns addressed.

## 2019-05-16 NOTE — PLAN OF CARE
VSS.  Pain well controlled, requesting prn pain medications as needed.  Up independently in room.  Working on breastfeeding  and  cares. She is spending most of her time downstairs with her son.  She is disappointed that baby needs to be in NICU for one more day.  Her spouse is very supportive.  On pathway. Continue to monitor and notify MD as needed.

## 2019-07-01 ENCOUNTER — TRANSFERRED RECORDS (OUTPATIENT)
Dept: PHYSICAL THERAPY | Facility: CLINIC | Age: 34
End: 2019-07-01

## 2019-07-31 ENCOUNTER — THERAPY VISIT (OUTPATIENT)
Dept: PHYSICAL THERAPY | Facility: CLINIC | Age: 34
End: 2019-07-31
Payer: COMMERCIAL

## 2019-07-31 DIAGNOSIS — M62.89 PFD (PELVIC FLOOR DYSFUNCTION): ICD-10-CM

## 2019-07-31 DIAGNOSIS — N39.3 URINARY, INCONTINENCE, STRESS FEMALE: ICD-10-CM

## 2019-07-31 DIAGNOSIS — K59.02 CONSTIPATION DUE TO OUTLET DYSFUNCTION: ICD-10-CM

## 2019-07-31 PROBLEM — K59.00 CONSTIPATION: Status: ACTIVE | Noted: 2019-07-31

## 2019-07-31 PROCEDURE — 97161 PT EVAL LOW COMPLEX 20 MIN: CPT | Mod: GP | Performed by: PHYSICAL THERAPIST

## 2019-07-31 PROCEDURE — 97530 THERAPEUTIC ACTIVITIES: CPT | Mod: GP | Performed by: PHYSICAL THERAPIST

## 2019-07-31 PROCEDURE — 97112 NEUROMUSCULAR REEDUCATION: CPT | Mod: GP | Performed by: PHYSICAL THERAPIST

## 2019-07-31 PROCEDURE — 97140 MANUAL THERAPY 1/> REGIONS: CPT | Mod: GP | Performed by: PHYSICAL THERAPIST

## 2019-07-31 NOTE — PROGRESS NOTES
[facility] Initial Evaluation  Subjective:  The history is provided by the patient.   Patient is a 33 year old  female who presents to [facility] with urinary stress incontinence, constipation, and the sensation of fullness in vaginal vault(prolapse feeling), and mild diastasis recti.  Patient states she gave birth vaginally May 14, 2019 to a 10 lb. 8 oz. Baby with a 3 hour and 30 minute second stage of labor.  Patient reports that she has to lean to one side when sitting on the toilet to fully empty her bladder, and patient states she has to strain slightly to empty fully.  Patient does not complain of pain and states that overall her health is good.    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 No   Do you leak with cough,sneeze, jumping, running?Yes   Any other activities that cause leaking? No   Do you have triggers that make you feel you can't wait to go to the bathroom? No   Type of pad and number used per day? none  When you leak what is the amount? Small drops  How long can you delay the need to urinate? 30 minutes   Do you feel excessive pressure in pelvic floor:no   Frequency of daytime urination: 2 hours  Frequency of nighttime urination: once  Can you stop the flow of urine when on the toilet? Yes  Is the volume of urine passed usually: large. (8sec rule= 250ml with average bladder storing 400-600ml)  Do you strain to pass urine? Yes  Do you have a slow or hesitant urinary stream? Yes  Do you have difficulty initiating the urine stream? No  Is urination painful? No  How many bladder infections have  you had in last 12 months? none  Fluid intake(one glass is 8oz or one cup) 4-6 glasses/day, 0 caffinated glasses/day  0  alcohol glasses/day.  Bowel habits:  Frequency of bowel movements? 1 times a week  Consistancy of stool? hard, [place] Stool Scale 2  Do you ignore the urge to defecate? No  Do you strain to pass stool? Yes  Pelvic Pain:  Do you have any pelvic pain with intercourse, exams, use of tampons? No  Is initial penetration during intercourse painful? Yes  Is deeper penetration painful? Yes  Do you use lubricant? Yes What kind? [place] Naturals  Are you sexually active?Yes  Have you ever been worried for your physical safety? No  Have you practiced the PF(kegel) exercises for 4 or more weeks? Yes  Marinoff Scale:MA  (Level 3: Abstinence from intercourse because of severe pain. Level 2: Painful intercourse which limites frequency of activity. Level 1: Painful intercourse not severe enough to prevent activity.)    Treatment/Education provided this session (see flow sheet for additional information):    Self Care Management/Patient education (12 min): Today's session consisted of education regarding pelvic floor muscle anatomy, normal bladder function, urge suppression techniques and/or relaxation techniques as indicated, and instruction in how to complete a bladder diary for assessment next visit. Depending on patient presentation, timed voids, double voids, and proper fluid/fiber intake discussed. Pt was instructed in the pathoanatomy of the pelvic floor utilizing pelvic model.  We discussed what pelvic floor physical therapy is, components of exam, and typical patient progression. Prior to internal PFM exam,  patient was told that they were in control of exam progression, and if at any time were uncomfortable and wished to discontinue we would.            NMR pelvic pain (12 min):  Pt education regarding contributing factors to pelvic pain and dysfunction related to overactivity in the pelvic floor.  Included  resources for relaxed awareness and pelvic floor quieting techniqes.  Extra time spent describing pelvic floor muscle exam and treatment plan/goals, with attention to potential history that may contribute to current symptoms.  Included resources for home release techniques using dilators and/or thera wand as indicated.  Recommended partner involvement if available.  Discussed in detail potential physiological and behavioral components of pelvic pain.  Demonstrated/performed techniques for input to painful area while using visualization and relaxation.                                        Objective:  System                                 Pelvic Dysfunction Evaluation:    Bladder/Pelvic Problems:   female with delivery date 2019 to a 10 lb 8 oz baby with 2nd degree vaginal tear.  Patient complaining of a fullness feeling in her vaginal vault area, constipation, dyspareunia and USI>  Storage Problem:  Stress incontinence  Emptying Problem:  Dyspareunia, strain to void and hesitancy  Dyspareunia:  Grade 1      Flexibility:    Tightness present at:Adductors; Iliopsoas; Hamstrings; Piriformis and Gluteals  obturator internus  Abdominal Wall:  Abdominal wall pelvic: Fascial restriction pubovesical ligament and urachus. MFR moderate psoas /iliacus.    Trigger Points:  Iliopsoas    Pelvic Clock Exam:    Ischiocavernosis pain:  -  Bulbocavernosis pain:  -  Transverse Perineal:  -  Levator ANI:  +/-  Perineal Body:  -  SI Provocation:  NA        Reflex Testing:  NA    External Assessment:    Skin Condition:  Normal  Scars:  Well healed  Bearing Down/Coughing:  Normal  Tissue Symmetry:  Normal  Introitus:  Normal  Muscle Contraction/Perineal Mobility:  Slight lift, no urogential triangle descent  Internal Assessment:  Internal assessment pelvic: Right > left TP's levator ani assessed vaginally.                SEMG Biofeedback:  NA                  Additional History:  Delivery History:  Tearing and vaginal  delivery  Number of Pregnancies: 1  Number of Live Births: 1  Caffeine Consumption:  0                     General     ROS    Assessment/Plan:    Patient is a 33 year old female with pelvic complaints.    Patient has the following significant findings with corresponding treatment plan.                Diagnosis 1: Urinary stress incontinence  Decreased strength - therapeutic exercise, therapeutic activities and home program  Impaired muscle performance - biofeedback, electric stimulation, neuro re-education and home program  Decreased function - therapeutic activities and home program  Diagnosis 2:  Pelvic floor dysfunction   Pain -  US, electric stimulation, manual therapy, self management, education and home program  Decreased strength - therapeutic exercise, therapeutic activities and home program  Impaired muscle performance - biofeedback, electric stimulation, neuro re-education and home program  Decreased function - therapeutic activities and home program  Diagnosis 3:  constipation  Decreased function - therapeutic activities and home program   Diagnosis 4:  dyspareunia  Pain -  US, electric stimulation, manual therapy, self management, education and home program  Impaired muscle performance - biofeedback, electric stimulation, neuro re-education and home program  Decreased function - therapeutic activities and home program    Therapy Evaluation Codes:   1) History comprised of:   Personal factors that impact the plan of care:      None.    Comorbidity factors that impact the plan of care are:      Bowel/bladder changes, Depression, Migraines/headaches and Overweight.     Medications impacting care: None.  2) Examination of Body Systems comprised of:   Body structures and functions that impact the plan of care:      Pelvis.   Activity limitations that impact the plan of care are:      Jumping, Running, Pelvic Exam, [place], Stress incontinence and constipation.  3) Clinical presentation characteristics  are:   Stable/Uncomplicated.  4) Decision-Making    Low complexity using standardized patient assessment instrument and/or measureable assessment of functional outcome.  Cumulative Therapy Evaluation is: Low complexity.    Previous and current functional limitations:  (See Goal Flow Sheet for this information)    Short term and Long term goals: (See Goal Flow Sheet for this information)     Communication ability:  Patient appears to be able to clearly communicate and understand verbal and written communication and follow directions correctly.  Treatment Explanation - The following has been discussed with the patient:   RX ordered/plan of care  Anticipated outcomes  Possible risks and side effects  This patient would benefit from PT intervention to resume normal activities.   Rehab potential is good.    Frequency:  1 X week, once daily  Duration:  for 12 weeks  Discharge Plan:  Achieve all LTG.  Independent in home treatment program.  Reach maximal therapeutic benefit.    Please refer to the daily flowsheet for treatment today, total treatment time and time spent performing 1:1 timed codes.

## 2019-07-31 NOTE — LETTER
[facility]  [address] [place] [address] [id number]  [facility] [address]  [phone]    2019    Re: Mallika Ojeda   :   1985  MRN:  [id number]   REFERRING PHYSICIAN:   Ralph Navas    [facility]    Date of Initial Evaluation:  2019  Visits:  1  Rxs Used: 1  Reason for Referral:     PFD (pelvic floor dysfunction)  Urinary, incontinence, stress female  Constipation due to outlet dysfunction    [facility] Initial Evaluation    Subjective:  The history is provided by the patient.   Patient is a 33 year old  female who presents to [facility] with urinary stress incontinence, constipation, and the sensation of fullness in vaginal vault(prolapse feeling), and mild diastasis recti.  Patient states she gave birth vaginally May 14, 2019 to a 10 lb. 8 oz. Baby with a 3 hour and 30 minute second stage of labor.  Patient reports that she has to lean to one side when sitting on the toilet to fully empty her bladder, and patient states she has to strain slightly to empty fully.  Patient does not complain of pain and states that overall her health is good.    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 No   Do you leak with cough,sneeze, jumping, running?Yes   Any other activities that cause leaking? No   Do you have triggers that make you feel you can't wait to go to the bathroom? No   Type of pad and number used per day? none  When you leak what is the amount? Small drops  How long can you delay the need to  urinate? 30 minutes   Do you feel excessive pressure in pelvic floor:no   Frequency of daytime urination: 2 hours  Frequency of nighttime urination: once  Re: Mallika Ojeda   :   1985    Can you stop the flow of urine when on the toilet? Yes  Is the volume of urine passed usually: large. (8sec rule= 250ml with average bladder storing 400-600ml)  Do you strain to pass urine? Yes  Do you have a slow or hesitant urinary stream? Yes  Do you have difficulty initiating the urine stream? No  Is urination painful? No  How many bladder infections have you had in last 12 months? none  Fluid intake(one glass is 8oz or one cup) 4-6 glasses/day, 0 caffinated glasses/day  0  alcohol glasses/day.  Bowel habits:  Frequency of bowel movements? 1 times a week  Consistancy of stool? hard, [place] Stool Scale 2  Do you ignore the urge to defecate? No  Do you strain to pass stool? Yes  Pelvic Pain:  Do you have any pelvic pain with intercourse, exams, use of tampons? No  Is initial penetration during intercourse painful? Yes  Is deeper penetration painful? Yes  Do you use lubricant? Yes What kind? [place] Naturals  Are you sexually active?Yes  Have you ever been worried for your physical safety? No  Have you practiced the PF(kegel) exercises for 4 or more weeks? Yes  Marinoff Scale:MA  (Level 3: Abstinence from intercourse because of severe pain. Level 2: Painful intercourse which limites frequency of activity. Level 1: Painful intercourse not severe enough to prevent activity.)  Treatment/Education provided this session (see flow sheet for additional information):  Self Care Management/Patient education (12 min): Today's session consisted of education regarding pelvic floor muscle anatomy, normal bladder function, urge suppression techniques and/or relaxation techniques as indicated, and instruction in how to complete a bladder diary for assessment next visit. Depending on patient presentation, timed voids, double voids, and proper  fluid/fiber intake discussed. Pt was instructed in the pathoanatomy of the pelvic floor utilizing pelvic model.  We discussed what pelvic floor physical therapy is, components of exam, and typical patient progression. Prior to internal PFM exam,  patient was told that they were in control of exam progression, and if at any time were uncomfortable and wished to discontinue we would.      NMR pelvic pain (12 min):  Pt education regarding contributing factors to pelvic pain and dysfunction related to overactivity in the pelvic floor.  Included resources for relaxed awareness and pelvic floor quieting techniqes.  Extra time spent describing pelvic floor muscle exam and treatment plan/goals, with attention to potential history that may contribute to current symptoms.  Included resources for home release techniques using dilators and/or thera wand as indicated.  Recommended partner involvement if available.  Discussed in detail potential physiological and behavioral components of pelvic pain.  Demonstrated/performed techniques for input to painful area while using visualization and relaxation.                 Re: Mallika Ojeda   :   1985    Objective:  Pelvic Dysfunction Evaluation:    Bladder/Pelvic Problems:   female with delivery date 2019 to a 10 lb 8 oz baby with 2nd degree vaginal tear.  Patient complaining of a fullness feeling in her vaginal vault area, constipation, dyspareunia and USI>  Storage Problem:  Stress incontinence  Emptying Problem:  Dyspareunia, strain to void and hesitancy  Dyspareunia:  Grade 1    Flexibility:    Tightness present at:Adductors; Iliopsoas; Hamstrings; Piriformis and Gluteals  obturator internus  Abdominal Wall:  Abdominal wall pelvic: Fascial restriction pubovesical ligament and urachus. MFR moderate psoas /iliacus.  Trigger Points:  Iliopsoas  Pelvic Clock Exam:    Ischiocavernosis pain:  -  Bulbocavernosis pain:  -  Transverse Perineal:  -  Levator ANI:   +/-  Perineal Body:  -  SI Provocation:  NA  Reflex Testing:  NA  External Assessment:    Skin Condition:  Normal  Scars:  Well healed  Bearing Down/Coughing:  Normal  Tissue Symmetry:  Normal  Introitus:  Normal  Muscle Contraction/Perineal Mobility:  Slight lift, no urogential triangle descent  Internal Assessment:  Internal assessment pelvic: Right > left TP's levator ani assessed vaginally.    SEMG Biofeedback:  NA  Additional History:  Delivery History:  Tearing and vaginal delivery  Number of Pregnancies: 1  Number of Live Births: 1  Caffeine Consumption:  0       Assessment/Plan:    Patient is a 33 year old female with pelvic complaints.    Patient has the following significant findings with corresponding treatment plan.                Diagnosis 1: Urinary stress incontinence  Decreased strength - therapeutic exercise, therapeutic activities and home program  Impaired muscle performance - biofeedback, electric stimulation, neuro re-education and home program  Decreased function - therapeutic activities and home program  Diagnosis 2:  Pelvic floor dysfunction   Pain -  US, electric stimulation, manual therapy, self management, education and home program  Decreased strength - therapeutic exercise, therapeutic activities and home program  Impaired muscle performance - biofeedback, electric stimulation, neuro re-education and home program  Re: Mallika Ojeda   :   1985    Decreased function - therapeutic activities and home program  Diagnosis 3:  constipation  Decreased function - therapeutic activities and home program   Diagnosis 4:  dyspareunia  Pain -  US, electric stimulation, manual therapy, self management, education and home program  Impaired muscle performance - biofeedback, electric stimulation, neuro re-education and home program  Decreased function - therapeutic activities and home program  Therapy Evaluation Codes:   1) History comprised of:   Personal factors that impact the plan of care:       None.    Comorbidity factors that impact the plan of care are:      Bowel/bladder changes, Depression, Migraines/headaches and Overweight.     Medications impacting care: None.  2) Examination of Body Systems comprised of:   Body structures and functions that impact the plan of care:      Pelvis.   Activity limitations that impact the plan of care are:      Jumping, Running, Pelvic Exam, [place], Stress incontinence and constipation.  3) Clinical presentation characteristics are:   Stable/Uncomplicated.  4) Decision-Making    Low complexity using standardized patient assessment instrument and/or measureable assessment of functional outcome.  Cumulative Therapy Evaluation is: Low complexity.  Previous and current functional limitations:  (See Goal Flow Sheet for this information)    Short term and Long term goals: (See Goal Flow Sheet for this information)   Communication ability:  Patient appears to be able to clearly communicate and understand verbal and written communication and follow directions correctly.  Treatment Explanation - The following has been discussed with the patient:   RX ordered/plan of care  Anticipated outcomes  Possible risks and side effects  This patient would benefit from PT intervention to resume normal activities.   Rehab potential is good.  Frequency:  1 X week, once daily  Duration:  for 12 weeks  Discharge Plan:  Achieve all LTG.  Independent in home treatment program.  Reach maximal therapeutic benefit.                  Re: Mallika Ojeda   :   1985          Thank you for your referral.    INQUIRIES  Therapist: Rebecca Hill, PT  [facility] [place] [facility]  [address] [place] [address] [id number] [id number]  Phone: [phone]  Fax: [phone]

## 2019-08-07 ENCOUNTER — THERAPY VISIT (OUTPATIENT)
Dept: PHYSICAL THERAPY | Facility: CLINIC | Age: 34
End: 2019-08-07
Payer: COMMERCIAL

## 2019-08-07 DIAGNOSIS — M62.89 PFD (PELVIC FLOOR DYSFUNCTION): ICD-10-CM

## 2019-08-07 DIAGNOSIS — K59.02 CONSTIPATION DUE TO OUTLET DYSFUNCTION: ICD-10-CM

## 2019-08-07 DIAGNOSIS — N39.3 URINARY, INCONTINENCE, STRESS FEMALE: ICD-10-CM

## 2019-08-07 PROCEDURE — 97530 THERAPEUTIC ACTIVITIES: CPT | Mod: GP | Performed by: PHYSICAL THERAPIST

## 2019-08-07 PROCEDURE — 97140 MANUAL THERAPY 1/> REGIONS: CPT | Mod: GP | Performed by: PHYSICAL THERAPIST

## 2019-08-07 PROCEDURE — 97112 NEUROMUSCULAR REEDUCATION: CPT | Mod: GP | Performed by: PHYSICAL THERAPIST

## 2019-08-14 ENCOUNTER — THERAPY VISIT (OUTPATIENT)
Dept: PHYSICAL THERAPY | Facility: CLINIC | Age: 34
End: 2019-08-14
Payer: COMMERCIAL

## 2019-08-14 DIAGNOSIS — M62.89 PFD (PELVIC FLOOR DYSFUNCTION): ICD-10-CM

## 2019-08-14 DIAGNOSIS — K59.02 CONSTIPATION DUE TO OUTLET DYSFUNCTION: ICD-10-CM

## 2019-08-14 DIAGNOSIS — N39.3 URINARY, INCONTINENCE, STRESS FEMALE: ICD-10-CM

## 2019-08-14 PROCEDURE — 97140 MANUAL THERAPY 1/> REGIONS: CPT | Mod: GP | Performed by: PHYSICAL THERAPIST

## 2019-08-14 PROCEDURE — 97112 NEUROMUSCULAR REEDUCATION: CPT | Mod: GP | Performed by: PHYSICAL THERAPIST

## 2019-08-14 PROCEDURE — 97530 THERAPEUTIC ACTIVITIES: CPT | Mod: GP | Performed by: PHYSICAL THERAPIST

## 2019-08-21 ENCOUNTER — THERAPY VISIT (OUTPATIENT)
Dept: PHYSICAL THERAPY | Facility: CLINIC | Age: 34
End: 2019-08-21
Payer: COMMERCIAL

## 2019-08-21 DIAGNOSIS — K59.02 CONSTIPATION DUE TO OUTLET DYSFUNCTION: ICD-10-CM

## 2019-08-21 DIAGNOSIS — M62.89 PFD (PELVIC FLOOR DYSFUNCTION): ICD-10-CM

## 2019-08-21 DIAGNOSIS — N39.3 URINARY, INCONTINENCE, STRESS FEMALE: ICD-10-CM

## 2019-08-21 PROCEDURE — 97112 NEUROMUSCULAR REEDUCATION: CPT | Mod: GP | Performed by: PHYSICAL THERAPIST

## 2019-08-21 PROCEDURE — 97140 MANUAL THERAPY 1/> REGIONS: CPT | Mod: GP | Performed by: PHYSICAL THERAPIST

## 2019-08-21 PROCEDURE — 97530 THERAPEUTIC ACTIVITIES: CPT | Mod: GP | Performed by: PHYSICAL THERAPIST

## 2019-08-28 ENCOUNTER — THERAPY VISIT (OUTPATIENT)
Dept: PHYSICAL THERAPY | Facility: CLINIC | Age: 34
End: 2019-08-28
Payer: COMMERCIAL

## 2019-08-28 DIAGNOSIS — K59.02 CONSTIPATION DUE TO OUTLET DYSFUNCTION: ICD-10-CM

## 2019-08-28 DIAGNOSIS — M62.89 PFD (PELVIC FLOOR DYSFUNCTION): ICD-10-CM

## 2019-08-28 DIAGNOSIS — N39.3 URINARY, INCONTINENCE, STRESS FEMALE: ICD-10-CM

## 2019-08-28 PROCEDURE — 97530 THERAPEUTIC ACTIVITIES: CPT | Mod: GP | Performed by: PHYSICAL THERAPIST

## 2019-08-28 PROCEDURE — 97140 MANUAL THERAPY 1/> REGIONS: CPT | Mod: GP | Performed by: PHYSICAL THERAPIST

## 2019-08-28 PROCEDURE — 97112 NEUROMUSCULAR REEDUCATION: CPT | Mod: GP | Performed by: PHYSICAL THERAPIST

## 2019-09-04 ENCOUNTER — THERAPY VISIT (OUTPATIENT)
Dept: PHYSICAL THERAPY | Facility: CLINIC | Age: 34
End: 2019-09-04
Payer: COMMERCIAL

## 2019-09-04 DIAGNOSIS — K59.02 CONSTIPATION DUE TO OUTLET DYSFUNCTION: ICD-10-CM

## 2019-09-04 DIAGNOSIS — M62.89 PFD (PELVIC FLOOR DYSFUNCTION): ICD-10-CM

## 2019-09-04 DIAGNOSIS — N39.3 URINARY, INCONTINENCE, STRESS FEMALE: ICD-10-CM

## 2019-09-04 PROCEDURE — 97140 MANUAL THERAPY 1/> REGIONS: CPT | Mod: GP | Performed by: PHYSICAL THERAPIST

## 2019-09-04 PROCEDURE — 97530 THERAPEUTIC ACTIVITIES: CPT | Mod: GP | Performed by: PHYSICAL THERAPIST

## 2019-09-04 PROCEDURE — 97112 NEUROMUSCULAR REEDUCATION: CPT | Mod: GP | Performed by: PHYSICAL THERAPIST

## 2019-09-11 ENCOUNTER — THERAPY VISIT (OUTPATIENT)
Dept: PHYSICAL THERAPY | Facility: CLINIC | Age: 34
End: 2019-09-11
Payer: COMMERCIAL

## 2019-09-11 DIAGNOSIS — K59.02 CONSTIPATION DUE TO OUTLET DYSFUNCTION: ICD-10-CM

## 2019-09-11 DIAGNOSIS — M62.89 PFD (PELVIC FLOOR DYSFUNCTION): ICD-10-CM

## 2019-09-11 DIAGNOSIS — N39.3 URINARY, INCONTINENCE, STRESS FEMALE: ICD-10-CM

## 2019-09-11 PROCEDURE — 97530 THERAPEUTIC ACTIVITIES: CPT | Mod: GP | Performed by: PHYSICAL THERAPIST

## 2019-09-11 PROCEDURE — 97112 NEUROMUSCULAR REEDUCATION: CPT | Mod: GP | Performed by: PHYSICAL THERAPIST

## 2019-09-11 PROCEDURE — 97140 MANUAL THERAPY 1/> REGIONS: CPT | Mod: GP | Performed by: PHYSICAL THERAPIST

## 2019-09-18 ENCOUNTER — THERAPY VISIT (OUTPATIENT)
Dept: PHYSICAL THERAPY | Facility: CLINIC | Age: 34
End: 2019-09-18
Payer: COMMERCIAL

## 2019-09-18 DIAGNOSIS — N39.3 URINARY, INCONTINENCE, STRESS FEMALE: ICD-10-CM

## 2019-09-18 DIAGNOSIS — M62.89 PFD (PELVIC FLOOR DYSFUNCTION): ICD-10-CM

## 2019-09-18 DIAGNOSIS — K59.02 CONSTIPATION DUE TO OUTLET DYSFUNCTION: ICD-10-CM

## 2019-09-18 PROCEDURE — 97140 MANUAL THERAPY 1/> REGIONS: CPT | Mod: GP | Performed by: PHYSICAL THERAPIST

## 2019-09-18 PROCEDURE — 97112 NEUROMUSCULAR REEDUCATION: CPT | Mod: GP | Performed by: PHYSICAL THERAPIST

## 2019-09-18 PROCEDURE — 97530 THERAPEUTIC ACTIVITIES: CPT | Mod: GP | Performed by: PHYSICAL THERAPIST

## 2019-09-25 ENCOUNTER — THERAPY VISIT (OUTPATIENT)
Dept: PHYSICAL THERAPY | Facility: CLINIC | Age: 34
End: 2019-09-25
Payer: COMMERCIAL

## 2019-09-25 DIAGNOSIS — M62.89 PFD (PELVIC FLOOR DYSFUNCTION): ICD-10-CM

## 2019-09-25 DIAGNOSIS — N39.3 URINARY, INCONTINENCE, STRESS FEMALE: ICD-10-CM

## 2019-09-25 DIAGNOSIS — K59.02 CONSTIPATION DUE TO OUTLET DYSFUNCTION: ICD-10-CM

## 2019-09-25 PROCEDURE — 97530 THERAPEUTIC ACTIVITIES: CPT | Mod: GP | Performed by: PHYSICAL THERAPIST

## 2019-09-25 PROCEDURE — 97140 MANUAL THERAPY 1/> REGIONS: CPT | Mod: GP | Performed by: PHYSICAL THERAPIST

## 2019-09-25 PROCEDURE — 97112 NEUROMUSCULAR REEDUCATION: CPT | Mod: GP | Performed by: PHYSICAL THERAPIST

## 2019-10-02 ENCOUNTER — THERAPY VISIT (OUTPATIENT)
Dept: PHYSICAL THERAPY | Facility: CLINIC | Age: 34
End: 2019-10-02
Payer: COMMERCIAL

## 2019-10-02 DIAGNOSIS — K59.02 CONSTIPATION DUE TO OUTLET DYSFUNCTION: ICD-10-CM

## 2019-10-02 DIAGNOSIS — N39.3 URINARY, INCONTINENCE, STRESS FEMALE: ICD-10-CM

## 2019-10-02 DIAGNOSIS — M62.89 PFD (PELVIC FLOOR DYSFUNCTION): ICD-10-CM

## 2019-10-02 PROCEDURE — 97112 NEUROMUSCULAR REEDUCATION: CPT | Mod: GP | Performed by: PHYSICAL THERAPIST

## 2019-10-02 PROCEDURE — 97530 THERAPEUTIC ACTIVITIES: CPT | Mod: GP | Performed by: PHYSICAL THERAPIST

## 2019-10-02 PROCEDURE — 97110 THERAPEUTIC EXERCISES: CPT | Mod: GP | Performed by: PHYSICAL THERAPIST

## 2019-10-16 ENCOUNTER — THERAPY VISIT (OUTPATIENT)
Dept: PHYSICAL THERAPY | Facility: CLINIC | Age: 34
End: 2019-10-16
Payer: COMMERCIAL

## 2019-10-16 DIAGNOSIS — M62.89 PFD (PELVIC FLOOR DYSFUNCTION): ICD-10-CM

## 2019-10-16 DIAGNOSIS — N39.3 URINARY, INCONTINENCE, STRESS FEMALE: ICD-10-CM

## 2019-10-16 DIAGNOSIS — K59.02 CONSTIPATION DUE TO OUTLET DYSFUNCTION: ICD-10-CM

## 2019-10-16 PROCEDURE — 97112 NEUROMUSCULAR REEDUCATION: CPT | Mod: GP | Performed by: PHYSICAL THERAPIST

## 2019-10-16 PROCEDURE — 97530 THERAPEUTIC ACTIVITIES: CPT | Mod: GP | Performed by: PHYSICAL THERAPIST

## 2019-10-16 PROCEDURE — 97140 MANUAL THERAPY 1/> REGIONS: CPT | Mod: GP | Performed by: PHYSICAL THERAPIST

## 2019-11-13 ENCOUNTER — THERAPY VISIT (OUTPATIENT)
Dept: PHYSICAL THERAPY | Facility: CLINIC | Age: 34
End: 2019-11-13
Payer: COMMERCIAL

## 2019-11-13 DIAGNOSIS — M62.89 PFD (PELVIC FLOOR DYSFUNCTION): ICD-10-CM

## 2019-11-13 DIAGNOSIS — N39.3 URINARY, INCONTINENCE, STRESS FEMALE: ICD-10-CM

## 2019-11-13 DIAGNOSIS — K59.02 CONSTIPATION DUE TO OUTLET DYSFUNCTION: ICD-10-CM

## 2019-11-13 PROCEDURE — 97530 THERAPEUTIC ACTIVITIES: CPT | Mod: GP | Performed by: PHYSICAL THERAPIST

## 2019-11-13 PROCEDURE — 97140 MANUAL THERAPY 1/> REGIONS: CPT | Mod: GP | Performed by: PHYSICAL THERAPIST

## 2019-11-13 PROCEDURE — 97112 NEUROMUSCULAR REEDUCATION: CPT | Mod: GP | Performed by: PHYSICAL THERAPIST

## 2019-12-11 ENCOUNTER — THERAPY VISIT (OUTPATIENT)
Dept: PHYSICAL THERAPY | Facility: CLINIC | Age: 34
End: 2019-12-11
Payer: COMMERCIAL

## 2019-12-11 DIAGNOSIS — K59.02 CONSTIPATION DUE TO OUTLET DYSFUNCTION: ICD-10-CM

## 2019-12-11 DIAGNOSIS — M62.89 PFD (PELVIC FLOOR DYSFUNCTION): ICD-10-CM

## 2019-12-11 DIAGNOSIS — N39.3 URINARY, INCONTINENCE, STRESS FEMALE: ICD-10-CM

## 2019-12-11 PROCEDURE — 97530 THERAPEUTIC ACTIVITIES: CPT | Mod: GP | Performed by: PHYSICAL THERAPIST

## 2019-12-11 PROCEDURE — 97112 NEUROMUSCULAR REEDUCATION: CPT | Mod: GP | Performed by: PHYSICAL THERAPIST

## 2019-12-11 PROCEDURE — 97140 MANUAL THERAPY 1/> REGIONS: CPT | Mod: GP | Performed by: PHYSICAL THERAPIST

## 2020-02-27 PROBLEM — M62.89 PFD (PELVIC FLOOR DYSFUNCTION): Status: RESOLVED | Noted: 2019-07-31 | Resolved: 2020-02-27

## 2020-02-27 PROBLEM — K59.00 CONSTIPATION: Status: RESOLVED | Noted: 2019-07-31 | Resolved: 2020-02-27

## 2020-02-27 PROBLEM — N39.3 URINARY, INCONTINENCE, STRESS FEMALE: Status: RESOLVED | Noted: 2019-07-31 | Resolved: 2020-02-27

## 2020-02-27 NOTE — PROGRESS NOTES
Discharge Note    Progress reporting period is from initial evaluation date (please see noted date below) to Dec 11, 2019.  No linked episodes      Mallika failed to follow up and current status is unknown.  Please see information below for last relevant information on current status.  Patient seen for 13 visits.    SUBJECTIVE  Subjective changes noted by patient:  Overall doing good.  No incontinence noted.  Still note some pelvic floor tightness while at work.  Patient self managing and using wand and doing stretches.  Some problems with constipation. Plan to do prune juice and psylium.  Working part time and that is wonderful.   .  Current pain level is 0/10.     Previous pain level was  0/10.   Changes in function:  Yes (See Goal flowsheet attached for changes in current functional level)  Adverse reaction to treatment or activity: None    OBJECTIVE  Changes noted in objective findings: Discussed digital evacuation of stool done vaginally for rectocele.  Right psoas/ilacus mild> left , internal pelvic floor right > left mild.  PT and patient feel DC with HEP and self care appropriate at this time.  To leave chart open for 3 months just in case patient should need follow up.     ASSESSMENT/PLAN  Diagnosis: USI/constipation/PFD   Updated problem list and treatment plan:   Decreased function - HEP  Impaired muscle performance - HEP  STG/LTGs have been met or progress has been made towards goals:  Yes, please see goal flowsheet for most current information  Assessment of Progress: current status is unknown.    Last current status: Pt is progressing well   Self Management Plans:  HEP  I have re-evaluated this patient and find that the nature, scope, duration and intensity of the therapy is appropriate for the medical condition of the patient.  Mallika continues to require the following intervention to meet STG and LTG's:  HEP.    Recommendations:  Discharge with current home program.  Patient to follow up with MD as  needed.    Please refer to the daily flowsheet for treatment today, total treatment time and time spent performing 1:1 timed codes.

## 2020-12-13 LAB — GROUP B STREP PCR: NORMAL

## 2020-12-22 LAB
SARS-COV-2 RNA SPEC QL NAA+PROBE: NORMAL
SPECIMEN SOURCE: NORMAL

## 2020-12-22 PROCEDURE — U0003 INFECTIOUS AGENT DETECTION BY NUCLEIC ACID (DNA OR RNA); SEVERE ACUTE RESPIRATORY SYNDROME CORONAVIRUS 2 (SARS-COV-2) (CORONAVIRUS DISEASE [COVID-19]), AMPLIFIED PROBE TECHNIQUE, MAKING USE OF HIGH THROUGHPUT TECHNOLOGIES AS DESCRIBED BY CMS-2020-01-R: HCPCS | Performed by: PHYSICIAN ASSISTANT

## 2020-12-23 LAB
LABORATORY COMMENT REPORT: NORMAL
SARS-COV-2 RNA SPEC QL NAA+PROBE: NEGATIVE
SPECIMEN SOURCE: NORMAL

## 2020-12-26 ENCOUNTER — ANESTHESIA (OUTPATIENT)
Dept: OBGYN | Facility: CLINIC | Age: 35
End: 2020-12-26
Payer: COMMERCIAL

## 2020-12-26 ENCOUNTER — ANESTHESIA EVENT (OUTPATIENT)
Dept: OBGYN | Facility: CLINIC | Age: 35
End: 2020-12-26
Payer: COMMERCIAL

## 2020-12-26 ENCOUNTER — HOSPITAL ENCOUNTER (INPATIENT)
Facility: CLINIC | Age: 35
LOS: 2 days | Discharge: HOME HEALTH | End: 2020-12-28
Attending: OBSTETRICS & GYNECOLOGY | Admitting: OBSTETRICS & GYNECOLOGY
Payer: COMMERCIAL

## 2020-12-26 LAB
ABO + RH BLD: NORMAL
ABO + RH BLD: NORMAL
BASOPHILS # BLD AUTO: 0 10E9/L (ref 0–0.2)
BASOPHILS NFR BLD AUTO: 0.3 %
BLD GP AB SCN SERPL QL: NORMAL
BLOOD BANK CMNT PATIENT-IMP: NORMAL
DIFFERENTIAL METHOD BLD: NORMAL
EOSINOPHIL # BLD AUTO: 0 10E9/L (ref 0–0.7)
EOSINOPHIL NFR BLD AUTO: 0.5 %
ERYTHROCYTE [DISTWIDTH] IN BLOOD BY AUTOMATED COUNT: 13.2 % (ref 10–15)
HCT VFR BLD AUTO: 35.6 % (ref 35–47)
HGB BLD-MCNC: 12.2 G/DL (ref 11.7–15.7)
IMM GRANULOCYTES # BLD: 0.1 10E9/L (ref 0–0.4)
IMM GRANULOCYTES NFR BLD: 0.6 %
LYMPHOCYTES # BLD AUTO: 2 10E9/L (ref 0.8–5.3)
LYMPHOCYTES NFR BLD AUTO: 24.6 %
MCH RBC QN AUTO: 31.9 PG (ref 26.5–33)
MCHC RBC AUTO-ENTMCNC: 34.3 G/DL (ref 31.5–36.5)
MCV RBC AUTO: 93 FL (ref 78–100)
MONOCYTES # BLD AUTO: 0.4 10E9/L (ref 0–1.3)
MONOCYTES NFR BLD AUTO: 5.3 %
NEUTROPHILS # BLD AUTO: 5.5 10E9/L (ref 1.6–8.3)
NEUTROPHILS NFR BLD AUTO: 68.7 %
NRBC # BLD AUTO: 0 10*3/UL
NRBC BLD AUTO-RTO: 0 /100
PLATELET # BLD AUTO: 268 10E9/L (ref 150–450)
RBC # BLD AUTO: 3.82 10E12/L (ref 3.8–5.2)
SPECIMEN EXP DATE BLD: NORMAL
WBC # BLD AUTO: 7.9 10E9/L (ref 4–11)

## 2020-12-26 PROCEDURE — 370N000003 HC ANESTHESIA WARD SERVICE

## 2020-12-26 PROCEDURE — 3E0R3BZ INTRODUCTION OF ANESTHETIC AGENT INTO SPINAL CANAL, PERCUTANEOUS APPROACH: ICD-10-PCS | Performed by: ANESTHESIOLOGY

## 2020-12-26 PROCEDURE — 250N000009 HC RX 250: Performed by: OBSTETRICS & GYNECOLOGY

## 2020-12-26 PROCEDURE — 722N000001 HC LABOR CARE VAGINAL DELIVERY SINGLE

## 2020-12-26 PROCEDURE — 250N000013 HC RX MED GY IP 250 OP 250 PS 637: Performed by: OBSTETRICS & GYNECOLOGY

## 2020-12-26 PROCEDURE — 258N000003 HC RX IP 258 OP 636: Performed by: ANESTHESIOLOGY

## 2020-12-26 PROCEDURE — 86901 BLOOD TYPING SEROLOGIC RH(D): CPT | Performed by: OBSTETRICS & GYNECOLOGY

## 2020-12-26 PROCEDURE — 0HQ9XZZ REPAIR PERINEUM SKIN, EXTERNAL APPROACH: ICD-10-PCS | Performed by: OBSTETRICS & GYNECOLOGY

## 2020-12-26 PROCEDURE — 0UQMXZZ REPAIR VULVA, EXTERNAL APPROACH: ICD-10-PCS | Performed by: OBSTETRICS & GYNECOLOGY

## 2020-12-26 PROCEDURE — 36415 COLL VENOUS BLD VENIPUNCTURE: CPT | Performed by: OBSTETRICS & GYNECOLOGY

## 2020-12-26 PROCEDURE — 00HU33Z INSERTION OF INFUSION DEVICE INTO SPINAL CANAL, PERCUTANEOUS APPROACH: ICD-10-PCS | Performed by: ANESTHESIOLOGY

## 2020-12-26 PROCEDURE — 86900 BLOOD TYPING SEROLOGIC ABO: CPT | Performed by: OBSTETRICS & GYNECOLOGY

## 2020-12-26 PROCEDURE — 10907ZC DRAINAGE OF AMNIOTIC FLUID, THERAPEUTIC FROM PRODUCTS OF CONCEPTION, VIA NATURAL OR ARTIFICIAL OPENING: ICD-10-PCS | Performed by: OBSTETRICS & GYNECOLOGY

## 2020-12-26 PROCEDURE — 250N000009 HC RX 250: Performed by: ANESTHESIOLOGY

## 2020-12-26 PROCEDURE — 258N000003 HC RX IP 258 OP 636: Performed by: OBSTETRICS & GYNECOLOGY

## 2020-12-26 PROCEDURE — 86780 TREPONEMA PALLIDUM: CPT | Performed by: OBSTETRICS & GYNECOLOGY

## 2020-12-26 PROCEDURE — 86850 RBC ANTIBODY SCREEN: CPT | Performed by: OBSTETRICS & GYNECOLOGY

## 2020-12-26 PROCEDURE — 120N000012 HC R&B POSTPARTUM

## 2020-12-26 PROCEDURE — 250N000011 HC RX IP 250 OP 636: Performed by: ANESTHESIOLOGY

## 2020-12-26 PROCEDURE — 85025 COMPLETE CBC W/AUTO DIFF WBC: CPT | Performed by: OBSTETRICS & GYNECOLOGY

## 2020-12-26 RX ORDER — METHYLERGONOVINE MALEATE 0.2 MG/ML
200 INJECTION INTRAVENOUS
Status: DISCONTINUED | OUTPATIENT
Start: 2020-12-26 | End: 2020-12-26

## 2020-12-26 RX ORDER — OXYTOCIN/0.9 % SODIUM CHLORIDE 30/500 ML
1-24 PLASTIC BAG, INJECTION (ML) INTRAVENOUS CONTINUOUS
Status: DISCONTINUED | OUTPATIENT
Start: 2020-12-26 | End: 2020-12-26

## 2020-12-26 RX ORDER — FENTANYL CITRATE 50 UG/ML
50-100 INJECTION, SOLUTION INTRAMUSCULAR; INTRAVENOUS
Status: DISCONTINUED | OUTPATIENT
Start: 2020-12-26 | End: 2020-12-26

## 2020-12-26 RX ORDER — NALOXONE HYDROCHLORIDE 0.4 MG/ML
0.2 INJECTION, SOLUTION INTRAMUSCULAR; INTRAVENOUS; SUBCUTANEOUS
Status: DISCONTINUED | OUTPATIENT
Start: 2020-12-26 | End: 2020-12-26

## 2020-12-26 RX ORDER — TRANEXAMIC ACID 10 MG/ML
1 INJECTION, SOLUTION INTRAVENOUS EVERY 30 MIN PRN
Status: DISCONTINUED | OUTPATIENT
Start: 2020-12-26 | End: 2020-12-28 | Stop reason: HOSPADM

## 2020-12-26 RX ORDER — METHYLERGONOVINE MALEATE 0.2 MG/ML
200 INJECTION INTRAVENOUS
Status: DISCONTINUED | OUTPATIENT
Start: 2020-12-26 | End: 2020-12-28 | Stop reason: HOSPADM

## 2020-12-26 RX ORDER — MISOPROSTOL 200 UG/1
800 TABLET ORAL
Status: DISCONTINUED | OUTPATIENT
Start: 2020-12-26 | End: 2020-12-28 | Stop reason: HOSPADM

## 2020-12-26 RX ORDER — TRANEXAMIC ACID 10 MG/ML
1 INJECTION, SOLUTION INTRAVENOUS EVERY 30 MIN PRN
Status: DISCONTINUED | OUTPATIENT
Start: 2020-12-26 | End: 2020-12-26

## 2020-12-26 RX ORDER — SODIUM CHLORIDE, SODIUM LACTATE, POTASSIUM CHLORIDE, CALCIUM CHLORIDE 600; 310; 30; 20 MG/100ML; MG/100ML; MG/100ML; MG/100ML
INJECTION, SOLUTION INTRAVENOUS CONTINUOUS
Status: DISCONTINUED | OUTPATIENT
Start: 2020-12-26 | End: 2020-12-26

## 2020-12-26 RX ORDER — ACETAMINOPHEN 325 MG/1
650 TABLET ORAL EVERY 4 HOURS PRN
Status: DISCONTINUED | OUTPATIENT
Start: 2020-12-26 | End: 2020-12-28 | Stop reason: HOSPADM

## 2020-12-26 RX ORDER — MODIFIED LANOLIN
OINTMENT (GRAM) TOPICAL
Status: DISCONTINUED | OUTPATIENT
Start: 2020-12-26 | End: 2020-12-28 | Stop reason: HOSPADM

## 2020-12-26 RX ORDER — BISACODYL 10 MG
10 SUPPOSITORY, RECTAL RECTAL DAILY PRN
Status: DISCONTINUED | OUTPATIENT
Start: 2020-12-28 | End: 2020-12-28 | Stop reason: HOSPADM

## 2020-12-26 RX ORDER — IBUPROFEN 400 MG/1
800 TABLET, FILM COATED ORAL EVERY 6 HOURS PRN
Status: DISCONTINUED | OUTPATIENT
Start: 2020-12-26 | End: 2020-12-28 | Stop reason: HOSPADM

## 2020-12-26 RX ORDER — LIDOCAINE HYDROCHLORIDE AND EPINEPHRINE 15; 5 MG/ML; UG/ML
INJECTION, SOLUTION EPIDURAL PRN
Status: DISCONTINUED | OUTPATIENT
Start: 2020-12-26 | End: 2020-12-27 | Stop reason: HOSPADM

## 2020-12-26 RX ORDER — ACETAMINOPHEN 325 MG/1
650 TABLET ORAL EVERY 4 HOURS PRN
Status: DISCONTINUED | OUTPATIENT
Start: 2020-12-26 | End: 2020-12-26

## 2020-12-26 RX ORDER — ONDANSETRON 4 MG/1
4 TABLET, ORALLY DISINTEGRATING ORAL EVERY 6 HOURS PRN
Status: DISCONTINUED | OUTPATIENT
Start: 2020-12-26 | End: 2020-12-26

## 2020-12-26 RX ORDER — LIDOCAINE 40 MG/G
CREAM TOPICAL
Status: DISCONTINUED | OUTPATIENT
Start: 2020-12-26 | End: 2020-12-26

## 2020-12-26 RX ORDER — CARBOPROST TROMETHAMINE 250 UG/ML
250 INJECTION, SOLUTION INTRAMUSCULAR
Status: DISCONTINUED | OUTPATIENT
Start: 2020-12-26 | End: 2020-12-26

## 2020-12-26 RX ORDER — IBUPROFEN 400 MG/1
800 TABLET, FILM COATED ORAL
Status: DISCONTINUED | OUTPATIENT
Start: 2020-12-26 | End: 2020-12-26

## 2020-12-26 RX ORDER — OXYCODONE AND ACETAMINOPHEN 5; 325 MG/1; MG/1
1 TABLET ORAL
Status: DISCONTINUED | OUTPATIENT
Start: 2020-12-26 | End: 2020-12-26

## 2020-12-26 RX ORDER — OXYTOCIN/0.9 % SODIUM CHLORIDE 30/500 ML
100-340 PLASTIC BAG, INJECTION (ML) INTRAVENOUS CONTINUOUS PRN
Status: COMPLETED | OUTPATIENT
Start: 2020-12-26 | End: 2020-12-26

## 2020-12-26 RX ORDER — NALOXONE HYDROCHLORIDE 0.4 MG/ML
0.4 INJECTION, SOLUTION INTRAMUSCULAR; INTRAVENOUS; SUBCUTANEOUS
Status: DISCONTINUED | OUTPATIENT
Start: 2020-12-26 | End: 2020-12-26

## 2020-12-26 RX ORDER — NALBUPHINE HYDROCHLORIDE 10 MG/ML
2.5-5 INJECTION, SOLUTION INTRAMUSCULAR; INTRAVENOUS; SUBCUTANEOUS EVERY 6 HOURS PRN
Status: DISCONTINUED | OUTPATIENT
Start: 2020-12-26 | End: 2020-12-26

## 2020-12-26 RX ORDER — AMOXICILLIN 250 MG
1 CAPSULE ORAL 2 TIMES DAILY
Status: DISCONTINUED | OUTPATIENT
Start: 2020-12-26 | End: 2020-12-28 | Stop reason: HOSPADM

## 2020-12-26 RX ORDER — OXYTOCIN 10 [USP'U]/ML
10 INJECTION, SOLUTION INTRAMUSCULAR; INTRAVENOUS
Status: DISCONTINUED | OUTPATIENT
Start: 2020-12-26 | End: 2020-12-26

## 2020-12-26 RX ORDER — HYDROCORTISONE 2.5 %
CREAM (GRAM) TOPICAL 3 TIMES DAILY PRN
Status: DISCONTINUED | OUTPATIENT
Start: 2020-12-26 | End: 2020-12-28 | Stop reason: HOSPADM

## 2020-12-26 RX ORDER — ONDANSETRON 2 MG/ML
4 INJECTION INTRAMUSCULAR; INTRAVENOUS EVERY 6 HOURS PRN
Status: DISCONTINUED | OUTPATIENT
Start: 2020-12-26 | End: 2020-12-26

## 2020-12-26 RX ORDER — OXYTOCIN 10 [USP'U]/ML
10 INJECTION, SOLUTION INTRAMUSCULAR; INTRAVENOUS
Status: DISCONTINUED | OUTPATIENT
Start: 2020-12-26 | End: 2020-12-28 | Stop reason: HOSPADM

## 2020-12-26 RX ORDER — AMOXICILLIN 250 MG
2 CAPSULE ORAL 2 TIMES DAILY
Status: DISCONTINUED | OUTPATIENT
Start: 2020-12-26 | End: 2020-12-28 | Stop reason: HOSPADM

## 2020-12-26 RX ORDER — OXYTOCIN/0.9 % SODIUM CHLORIDE 30/500 ML
100 PLASTIC BAG, INJECTION (ML) INTRAVENOUS CONTINUOUS
Status: DISCONTINUED | OUTPATIENT
Start: 2020-12-26 | End: 2020-12-28 | Stop reason: HOSPADM

## 2020-12-26 RX ORDER — OXYTOCIN/0.9 % SODIUM CHLORIDE 30/500 ML
340 PLASTIC BAG, INJECTION (ML) INTRAVENOUS CONTINUOUS PRN
Status: DISCONTINUED | OUTPATIENT
Start: 2020-12-26 | End: 2020-12-28 | Stop reason: HOSPADM

## 2020-12-26 RX ORDER — EPHEDRINE SULFATE 50 MG/ML
5 INJECTION, SOLUTION INTRAMUSCULAR; INTRAVENOUS; SUBCUTANEOUS
Status: DISCONTINUED | OUTPATIENT
Start: 2020-12-26 | End: 2020-12-26

## 2020-12-26 RX ADMIN — Medication 10 ML: at 17:38

## 2020-12-26 RX ADMIN — SODIUM CHLORIDE, POTASSIUM CHLORIDE, SODIUM LACTATE AND CALCIUM CHLORIDE 125 ML/HR: 600; 310; 30; 20 INJECTION, SOLUTION INTRAVENOUS at 17:49

## 2020-12-26 RX ADMIN — Medication 2 MILLI-UNITS/MIN: at 14:37

## 2020-12-26 RX ADMIN — ACETAMINOPHEN 650 MG: 325 TABLET, FILM COATED ORAL at 20:02

## 2020-12-26 RX ADMIN — DOCUSATE SODIUM 50 MG AND SENNOSIDES 8.6 MG 1 TABLET: 8.6; 5 TABLET, FILM COATED ORAL at 22:55

## 2020-12-26 RX ADMIN — LIDOCAINE HYDROCHLORIDE AND EPINEPHRINE 2 ML: 15; 5 INJECTION, SOLUTION EPIDURAL at 17:35

## 2020-12-26 RX ADMIN — LIDOCAINE HYDROCHLORIDE AND EPINEPHRINE 3 ML: 15; 5 INJECTION, SOLUTION EPIDURAL at 17:34

## 2020-12-26 RX ADMIN — Medication 340 ML/HR: at 19:25

## 2020-12-26 RX ADMIN — SODIUM CHLORIDE, POTASSIUM CHLORIDE, SODIUM LACTATE AND CALCIUM CHLORIDE 1000 ML: 600; 310; 30; 20 INJECTION, SOLUTION INTRAVENOUS at 17:02

## 2020-12-26 RX ADMIN — IBUPROFEN 800 MG: 400 TABLET, FILM COATED ORAL at 20:02

## 2020-12-26 RX ADMIN — Medication 12 ML/HR: at 17:38

## 2020-12-26 RX ADMIN — SODIUM CHLORIDE, POTASSIUM CHLORIDE, SODIUM LACTATE AND CALCIUM CHLORIDE 1000 ML: 600; 310; 30; 20 INJECTION, SOLUTION INTRAVENOUS at 17:24

## 2020-12-26 RX ADMIN — SODIUM CHLORIDE, POTASSIUM CHLORIDE, SODIUM LACTATE AND CALCIUM CHLORIDE: 600; 310; 30; 20 INJECTION, SOLUTION INTRAVENOUS at 14:28

## 2020-12-26 ASSESSMENT — ACTIVITIES OF DAILY LIVING (ADL)
WALKING_OR_CLIMBING_STAIRS_DIFFICULTY: NO
HEARING_DIFFICULTY_OR_DEAF: NO
DOING_ERRANDS_INDEPENDENTLY_DIFFICULTY: NO
DIFFICULTY_EATING/SWALLOWING: NO
DRESSING/BATHING_DIFFICULTY: NO
FALL_HISTORY_WITHIN_LAST_SIX_MONTHS: NO
CONCENTRATING,_REMEMBERING_OR_MAKING_DECISIONS_DIFFICULTY: NO
DIFFICULTY_COMMUNICATING: NO
TOILETING_ISSUES: NO
PATIENT_/_FAMILY_COMMUNICATION_STYLE: SPOKEN LANGUAGE (ENGLISH OR BILINGUAL)
WEAR_GLASSES_OR_BLIND: NO

## 2020-12-26 NOTE — ANESTHESIA PREPROCEDURE EVALUATION
"Anesthesia Pre-Procedure Evaluation    Patient: Mallika Ojeda   MRN: [id number] : 1985          Preoperative Diagnosis: * No surgery found *        Past Medical History:   Diagnosis Date     Migraine      Varicose vein of leg      Past Surgical History:   Procedure Laterality Date     DILATION AND CURETTAGE       LIGATE VEIN      age 21       Anesthesia Evaluation     . Pt has had prior anesthetic.     No history of anesthetic complications          ROS/MED HX    ENT/Pulmonary:       Neurologic:       Cardiovascular:         METS/Exercise Tolerance:     Hematologic:         Musculoskeletal:         GI/Hepatic:         Renal/Genitourinary:         Endo:         Psychiatric:         Infectious Disease:         Malignancy:         Other:                          Physical Exam  Normal systems: cardiovascular and pulmonary    Airway   TM distance: >3 FB  Neck ROM: full    Dental     Cardiovascular       Pulmonary             Lab Results   Component Value Date    WBC 7.9 2020    HGB 12.2 2020    HCT 35.6 2020     2020       Preop Vitals  BP Readings from Last 3 Encounters:   20 114/70   19 113/74   18 120/72    Pulse Readings from Last 3 Encounters:   20 94   05/15/19 64   18 78      Resp Readings from Last 3 Encounters:   20 16   19 16   18 16    SpO2 Readings from Last 3 Encounters:   20 100%   19 100%   18 100%      Temp Readings from Last 1 Encounters:   20 36.4  C (97.5  F) (Temporal)    Ht Readings from Last 1 Encounters:   19 1.702 m (5' 7\")      Wt Readings from Last 1 Encounters:   19 96.2 kg (212 lb)    Estimated body mass index is 33.2 kg/m  as calculated from the following:    Height as of 19: 1.702 m (5' 7\").    Weight as of 19: 96.2 kg (212 lb).       Anesthesia Plan      History & Physical Review      ASA Status:  2 .    NPO Status:  Unknown    Plan for Epidural     Multip, no " problems during pregnancy, no medical problems chronic        Postoperative Care      Consents  Anesthetic plan, risks, benefits and alternatives discussed with:  Patient..                 Joe Cary MD

## 2020-12-26 NOTE — PLAN OF CARE
Data: Patient admitted to room 2212 at 1300. Patient is a . Prenatal record reviewed.   OB History    Para Term  AB Living   4 1 1 0 2 1   SAB TAB Ectopic Multiple Live Births   2 0 0 0 1      # Outcome Date GA Lbr Pepe/2nd Weight Sex Delivery Anes PTL Lv   4 Current            3 SAB 2020           2 Term 19 41w1d 05:15 / 03:58 4.75 kg (10 lb 7.6 oz) M Vag-Spont EPI N DESEAN      Name: VONNIE PEREZ      Apgar1: 9  Apgar5: 9   1 SAB 2018           .  Medical History:   Past Medical History:   Diagnosis Date     Migraine      Varicose vein of leg    .  Gestational age 39w1d. Vital signs per doc flowsheet. Fetal movement present. Patient reports Induction Of Labor   as reason for admission. Support persons Denilson present.  Action: Report from IVAN Negrete obtained at 1325. Care of patient assumed at 1325. Verbal consent for EFM, external fetal monitors applied. Admission assessment completed. Patient and support persons educated on labor process. Patient instructed to report change in fetal movement, contractions, vaginal leaking of fluid or bleeding, abdominal pain, or any concerns related to the pregnancy to her nurse/physician. Patient oriented to room, call light in reach.   Response: Dr. Navas informed of admission. Plan per provider is pitocin augmentation and then AROM. Patient verbalized understanding of education and verbalized agreement with plan. Patient coping with labor via has a birth plan on chart , does want an epidural..

## 2020-12-26 NOTE — H&P
2020        36yo  @ 39.1 wga here for elective IOL.  Her prenatal care has been complicated by suspected LGA.  Last US at 36 wks showed EFW >99%, 9 lb 4 oz.  Of note, EFW has been >99% since 20 wks. She has had a previous 41 wk VD of 10 lb 8 oz without any complications other than prolonged 2nd stage and suspected intra-amniotic infection.  There was no shoulder dystocia.  She has passed her 1h gtt, and a recent HA1c was also normal.  There have been no other major medical changes since her last exam based upon review of chart, nursing records, and examination.        PNLs: A pos, ABS neg, RI, RPR NR, HIV NR, HBsAg neg, GBS NEG, COVID-19 NEG (20)      /80   Pulse 94   Temp 97.5  F (36.4  C) (Temporal)   Resp 16   Breastfeeding No   Gen: NAD, A&O x 3  Abd: gravid, NT  SVE: 4/50/-3, amniotomy performed, clear fluid noted  FHT: cat 1 reactive  TOCO: Q2-3 min, oxy @ 8 mU/min      A/P: 36yo  @ 39.1 wga here for eIOL.  AVSS.  - continue pitocin per protocol  - epidural ok whenever pt desires  - pt's birth plan reviewed  - GBS neg  - COVID-19 neg (20)      KATHY SHARMA MD

## 2020-12-26 NOTE — ANESTHESIA PROCEDURE NOTES
Procedure note : epidural catheter      Staff -   Anesthesiologist:  Joe Cary MD  Performed By: anesthesiologist  Pre-Procedure  Performed by Joe Cary MD  Location: OB      Pre-Anesthestic Checklist: patient identified, IV checked, risks and benefits discussed, informed consent, pre-op evaluation and at physician/surgeon's request    Timeout  Correct Patient: Yes   Correct Procedure: Yes       Correct Position: Yes     .   Procedure Documentation    .    Procedure: epidural catheter, .   Patient Position:sitting Insertion Site:L3-4  (midline approach) Injection technique: LORT air   Local skin infiltrated with 3 mL of 1% lidocaine.  TRACEY at 4 cm    Patient Prep/Sterile Barriers; mask, sterile gloves, povidone-iodine 7.5% surgical scrub, patient draped.  .  Needle: ToEashmarty needle   Needle Gauge: 17.    Needle Length (Inches) 3.5   # of attempts: 1 and # of redirects:  .    Catheter: 19 G . .  Catheter threaded easily  4 cm epidural space.  .   .    Assessment/Narrative  Paresthesias: No.  .  .  Aspiration negative for heme or CSF  . Test dose of mL lidocaine 1.5% w/ 1:200,000 epinephrine at. Test dose negative for signs of intravascular, subdural or intrathecal injection. Comments:  Test dose of 3cc negative, then additional 2cc of test dose given.    Adhesive spray, tegaderm, and tape to secure  Bolused with 10cc of pump mix

## 2020-12-27 LAB
HGB BLD-MCNC: 12.4 G/DL (ref 11.7–15.7)
T PALLIDUM AB SER QL: NONREACTIVE

## 2020-12-27 PROCEDURE — 250N000013 HC RX MED GY IP 250 OP 250 PS 637: Performed by: OBSTETRICS & GYNECOLOGY

## 2020-12-27 PROCEDURE — 36415 COLL VENOUS BLD VENIPUNCTURE: CPT | Performed by: OBSTETRICS & GYNECOLOGY

## 2020-12-27 PROCEDURE — 85018 HEMOGLOBIN: CPT | Performed by: OBSTETRICS & GYNECOLOGY

## 2020-12-27 PROCEDURE — 120N000012 HC R&B POSTPARTUM

## 2020-12-27 RX ADMIN — IBUPROFEN 800 MG: 400 TABLET, FILM COATED ORAL at 23:20

## 2020-12-27 RX ADMIN — ACETAMINOPHEN 650 MG: 325 TABLET, FILM COATED ORAL at 03:17

## 2020-12-27 RX ADMIN — ACETAMINOPHEN 650 MG: 325 TABLET, FILM COATED ORAL at 23:20

## 2020-12-27 RX ADMIN — IBUPROFEN 800 MG: 400 TABLET, FILM COATED ORAL at 16:28

## 2020-12-27 RX ADMIN — ACETAMINOPHEN 650 MG: 325 TABLET, FILM COATED ORAL at 10:10

## 2020-12-27 RX ADMIN — DOCUSATE SODIUM 50 MG AND SENNOSIDES 8.6 MG 1 TABLET: 8.6; 5 TABLET, FILM COATED ORAL at 19:56

## 2020-12-27 RX ADMIN — IBUPROFEN 800 MG: 400 TABLET, FILM COATED ORAL at 10:10

## 2020-12-27 RX ADMIN — DOCUSATE SODIUM 50 MG AND SENNOSIDES 8.6 MG 1 TABLET: 8.6; 5 TABLET, FILM COATED ORAL at 07:50

## 2020-12-27 RX ADMIN — IBUPROFEN 800 MG: 400 TABLET, FILM COATED ORAL at 03:17

## 2020-12-27 RX ADMIN — ACETAMINOPHEN 650 MG: 325 TABLET, FILM COATED ORAL at 16:28

## 2020-12-27 NOTE — LACTATION NOTE
"Initial visit with Savi, NAM, and baby boy. Savi was breast feeding infant on R breast in cross cradle hold, infant being supported by a nursing pillow. Savi sandwiching her breast and infant latched wide with nutritive suck pattern observed. Savi's first baby had been in the NICU initially and he didn't latch well in the beginning. So Savi is so happy with how breastfeeding has started out with this infant. Infant was LGA, blood sugar checks are now passed. Savi is supplementing infant with 10 ml of formula after breast feeding (nathan finger feeding but they plan on supplementing with bottle at home). Offered to bring in a breast pump for extra stimulation but Savi said she has been hand expressing after each breast feeding session and would like to continue that regimen.    We reviewed  breastfeeding basics: 1) watch for early feeding cues (licking lips, stirring or rooting, sucking movement with mouth, hands to mouth), 2) feed infant on demand, a minimum of 8 times in 24 hours, and 3) techniques to waking a sleepy baby to nurse (undress infant, change diaper if necessary, gently stroking bottom of feet and back, snuggling infant skin to skin, expressing colostrum). Highlighted breast feeding section in our \"Guide to Postpartum and  Care\" and showed how to record infant feedings along with voids and stools in the provided feeding log. Instructed in hand expression, encouraging mother to watch (provided) hand expression video. Parents educated to \"typical\"  feeding patterns/behavior from 1st day sleepiness through cluster-feeding on day 2.     We reviewed breastfeeding positions and techniques to obtaining/maintaining a deep latch (including nose to nipple alignment and supporting infant's shoulder blades vs head when bringing infant in to latch). Discussed BF should feel like a strong \"tug or pull\" when infant is suckling and if mother experiences a \"pinching or " "biting\" sensation, how to un-latch infant properly, assess nipple shape and make any necessary adjustments with positioning before re-latching. Discussed physiology of milk production from colostrum through milk coming in and how the breasts should begin to feel \"heavy or full\" between day 3-5. Discussed normal infant weight loss and when infant should be back to birth weight.     Answered questions regarding \"how to know when infant is done at the breast.\" Educated to infant satiety signs; encouraged listening for audible swallows along with watching for changes in infant's stool color. Stressed the importance of continuing to track infant's feeds and void/stools patterns. Savi has a new breast pump for home use.    Appreciative of visit.      Addendum:  Savi had a question about increasing supplement amount (giving 10ml currently post breast feeding). Infant continues to nurse well but seems unsatisfied after supplement per Savi. Savi said she tried bringing infant back to breast but wouldn't latch. LC \"gave permission\" for parents to increase supplement. Infant is a big boy and he may require higher volume than Savi can produced-initially. Suggested it may take a week or so before Savi's output meets infants demands. Offered again that we could bring in a breast pump if Savi wanted to begin pumping. Savi is still content with hand expression.     Provided additional supplies for finger feeding and also brought bottle into the room. Educating that if parents increase supplement volume about 20 ml, a bottle would be a safer and more effective way to provide supplement.    Paula Feliciano RN, IBCLC            "

## 2020-12-27 NOTE — L&D DELIVERY NOTE
Delivery Date:  12/26/2020      DELIVERY SUMMARY:  The patient is a 35-year-old G4, P1-0-2-1 who presented to Labor and Delivery at 39 weeks and 1 day gestation for elective induction of labor.  Of note, her prenatal care had been complicated by suspected LGA with her last ultrasound at 36 weeks showing estimated fetal weight of greater than 99th percentile, 9 pounds 4 ounces.  Of note, estimated fetal weight has been greater than 99th percentile since weeks.  She had passed her one hour Glucola screening as well as hemoglobin A1c that was within normal limits.  She has had a previous 41-week vaginal delivery of a 10-pound 8 ounce infant without complications other than the prolonged second stage intra-amniotic infection.  Upon arrival to Labor and Delivery, she was found to be 3 cm dilated.  She started on Pitocin per protocol.  Fetal heart tones were category 1 reactive.  At approximately 5:00 p.m., she was found to be 4 cm dilated, 50% effaced and -3 station.  Amniotomy was performed and clear fluid was noted.  Of note, her group B strep status was confirmed negative.  Her COVID-19 also resulted negative back on 12/22.  She subsequently underwent epidural placement with adequate pain relief.  She subsequently made rapid cervical change and at approximately 18:30 she was found to be anterior lip, 100% effaced and +1 station.  Fetal heart tones at this time were exhibiting intermittent variable decelerations; however, overall moderate variability and normal baseline.  At approximately 19:10, she began pushing.  She pushed effectively and at approximately 19:22, the infant head delivered in right occiput anterior presentation.  Downward traction was gently performed during maternal expulsive efforts; however, anterior shoulder did not deliver easily.  The patient's head of the bed was lowered to a flat position and the hips were hyperflexed in the Polly maneuver.  Also, during this time, a loose nuchal cord was  noted and this was reduced.  The infant's head was subsequently turned towards maternal right and with gentle downward traction and maternal expulsive efforts, the anterior shoulder then delivered without further difficulty.  The posterior shoulder did not deliver easily due to a tight perineum; however, with gentle traction beneath the posterior shoulder the infant was subsequently delivered without further difficulty.  The infant was then placed on the maternal abdomen and was bulb suctioned and the cord was clamped and cut after approximately 1-1/2 minutes.  Cord blood was then obtained.      Placenta delivered spontaneously, intact with a 3-vessel cord.  Uterus became firm with fundal massage and IV Pitocin.  Inspection revealed intact perineum.  There was a superficial laceration just right lateral of the urethral meatus on the inner aspect of the right labia minora, which was then repaired with 4-0 Vicryl suture in a continuous running fashion.  Reinspection revealed excellent hemostasis and no further lacerations.  Infant, Apgars were 8 and 9, weighing 10 lb 15 oz.  All sponge and instrument counts were correct x2.  Quantitative blood loss approximately 100 mL.         KATHY SHARMA MD             D: 2020   T: 2020   MT:       Name:     ERIC PEREZ   MRN:      [id number]        Account:        [id number]   :      1985        Delivery Date:  2020               Document: [id number]

## 2020-12-27 NOTE — PLAN OF CARE
Patient taking Tylenol and Ibuprofen for pain with adequate pain relief. Patient ambulating independently, voiding without difficulty. Encouraged patient to call with needs/questions. Call light within reach, will continue to monitor.

## 2020-12-27 NOTE — ANESTHESIA POSTPROCEDURE EVALUATION
Patient: Mallika Ojeda    * No procedures listed *    Diagnosis:* No pre-op diagnosis entered *  Diagnosis Additional Information: No value filed.    Anesthesia Type:  Epidural    Note:  Anesthesia Post Evaluation    Patient location during evaluation: Bedside  Patient participation: Able to fully participate in evaluation  Level of consciousness: awake and alert  Pain management: adequate  Airway patency: patent  Cardiovascular status: hemodynamically stable  Respiratory status: nonlabored ventilation and unassisted  Hydration status: euvolemic  PONV: none       Comments: No complications. Patient is ambulatory, has voided, denies back pain.         Last vitals:  Vitals:    12/26/20 2130 12/26/20 2205 12/27/20 0300   BP: 104/57 116/68 120/73   Pulse:  67 77   Resp:  16 16   Temp:  36.7  C (98.1  F) 37.3  C (99.1  F)   SpO2:            Electronically Signed By: Uday Guzmán MD  December 27, 2020  12:13 PM

## 2020-12-27 NOTE — PLAN OF CARE
Pt brought to floor at 2200 with infant in arms and  by her side. Report received from IVAN MYERS. Infant safe sleep, bulb syringe use and feeding schedule addressed. Orientated to room. All questions answered. Band numbers verified. Will continue to monitor.

## 2020-12-27 NOTE — L&D DELIVERY NOTE
2020         of viable male infant in REMEDIOS presentation, nuchal cord x 1 reduced at perineum.  Shoulder dystocia resolved with Polly and rotation of head, total time 15-20 sec only with no further complications.  Please see dictated delivery note for further details.        KATHY SHARMA MD

## 2020-12-27 NOTE — PROGRESS NOTES
2020        S: Pt comfortable with epidural.      O: /78   Pulse 94   Temp 97.9  F (36.6  C)   Resp 16   SpO2 100%   Breastfeeding No   Gen: NAd, A&Ox 3  Abd: gravid, NT  SVE: AL/100/-1  FHT: 130, mod variability, +accels, occ variable decels  TOCO: Q2 min        A/P: 34yo  @ 39.1 wga here for elective IOL.  AVSS.  - comfortable with epidural  - Anticipate 2nd stage soon        KATHY SHARMA MD

## 2020-12-27 NOTE — PLAN OF CARE
VSS. Fundus firm, U/2, scant bleeding. Ambulating and voiding independently. IV removed. Br feeding going well, mother independent with infant cares. Supplementing infant with sim & ebm. Taking tylenol and ibuprofen for minimal pain. Using ice and tux on perineum. Encouraged to call with any questions. Will continue to monitor.

## 2020-12-27 NOTE — PLAN OF CARE
PT assisted to BR, Pt was able to void,. Pericare performed. Pt transferred to room 414 via WC with infant in arms

## 2020-12-27 NOTE — PROGRESS NOTES
[facility]   Obstetrics Progress Note    Subjective: This is the patient's first day since Vaginal delivery. She is doing well.  She is urinating on her own. Pain is controlled with medication.    Objective:   All vitals stable  Temp: 99.1  F (37.3  C) Temp src: Oral BP: 120/73 Pulse: 77   Resp: 16 SpO2: 96 %        EXAM:  Constitutional: healthy, alert, no distress.   Abdomen: Abdomen soft, non-tender. BS normal. No masses, fundus is firm.  JOINT/EXTREMITIES: extremities normal     Last hemoglobin was   Hemoglobin   Date Value Ref Range Status   12/27/2020 12.4 11.7 - 15.7 g/dL Final   ]    Assessment: Stable postpartum course.    Plan: Routine care. Ambulation encouraged  Breast feeding strategies discussed  Pain control measures as needed  Reportable signs and symptoms dicussed with the patient  Discharge later today    Hope Cheney MD

## 2020-12-28 VITALS
OXYGEN SATURATION: 96 % | DIASTOLIC BLOOD PRESSURE: 72 MMHG | TEMPERATURE: 97.9 F | RESPIRATION RATE: 16 BRPM | HEART RATE: 68 BPM | SYSTOLIC BLOOD PRESSURE: 121 MMHG

## 2020-12-28 PROCEDURE — 250N000011 HC RX IP 250 OP 636: Performed by: OBSTETRICS & GYNECOLOGY

## 2020-12-28 PROCEDURE — 250N000013 HC RX MED GY IP 250 OP 250 PS 637: Performed by: OBSTETRICS & GYNECOLOGY

## 2020-12-28 PROCEDURE — 90471 IMMUNIZATION ADMIN: CPT | Performed by: OBSTETRICS & GYNECOLOGY

## 2020-12-28 PROCEDURE — 90707 MMR VACCINE SC: CPT | Performed by: OBSTETRICS & GYNECOLOGY

## 2020-12-28 RX ADMIN — IBUPROFEN 800 MG: 400 TABLET, FILM COATED ORAL at 12:29

## 2020-12-28 RX ADMIN — ACETAMINOPHEN 650 MG: 325 TABLET, FILM COATED ORAL at 06:21

## 2020-12-28 RX ADMIN — MEASLES, MUMPS, AND RUBELLA VIRUS VACCINE LIVE 0.5 ML: 1000; 12500; 1000 INJECTION, POWDER, LYOPHILIZED, FOR SUSPENSION SUBCUTANEOUS at 08:49

## 2020-12-28 RX ADMIN — ACETAMINOPHEN 650 MG: 325 TABLET, FILM COATED ORAL at 12:29

## 2020-12-28 RX ADMIN — IBUPROFEN 800 MG: 400 TABLET, FILM COATED ORAL at 06:21

## 2020-12-28 RX ADMIN — DOCUSATE SODIUM 50 MG AND SENNOSIDES 8.6 MG 1 TABLET: 8.6; 5 TABLET, FILM COATED ORAL at 08:30

## 2020-12-28 NOTE — PLAN OF CARE
Vital signs stable. Postpartum assessment WDL. Pain controlled with tylenol and motrin . Patient up ambulating voiding without difficulty. Breastfeeding on cue with no assist.started pumping and hand expressing Patient and infant bonding well. Will continue with current plan of care.

## 2020-12-28 NOTE — PLAN OF CARE
Doing well,vss,voiding with out difficulty,pain control with tylenol&ibuprofen,baby breast feeding well,pumping after nursing getting drops of colostrum&bottle feeding formula.Plan to discharge today&follow up in clinic in 2 & 8 weeks or sooner with any concerns.

## 2020-12-28 NOTE — PROGRESS NOTES
December 28, 2020      SUBJECTIVE: No acute overnight events.  Mild abdominal cramping. +Lochia moderate.  Tolerating PO. Ambulating/urinating w/o difficulty.  Denies CP/palp/SOB/LH.    OBJECTIVE:  /71   Pulse 63   Temp 98  F (36.7  C) (Oral)   Resp 16   SpO2 96%   Breastfeeding Unknown   Gen: NAD, A&O x 3  Abd: Uterus firm, contracted, NT  Ext: mild edema BL LEs, symmetric, no CT    Hemoglobin   Date Value Ref Range Status   12/27/2020 12.4 11.7 - 15.7 g/dL Final   12/26/2020 12.2 11.7 - 15.7 g/dL Final   ]    A/P: PPD#2 s/p vaginal delivery c/b mild shoulder dystocia.  Doing well.  Afebrile, VSS.  - ibuprofen, tylenol prn pain  - breastfeeding  - regular diet as tolerated  - routine postpartum care  - f/u in office in 2 and 8 wks MATILDE SHARMA MD

## 2020-12-28 NOTE — PLAN OF CARE
Vital signs stable. Patient voiding without difficulty. Able to ambulate in room free of dizziness. Taking tylenol/ibuprofen for pain management. Working on breastfeeding infant every 2-3 hours, hand expressing EBM and bottling infant formula/EBM. Encouraged to call with questions/concerns. Will continue to monitor.

## 2020-12-28 NOTE — DISCHARGE INSTRUCTIONS
You should be on pelvic rest with no heavy lifting >30 lb for 6 weeks.  Please call or return if you have fever >/= 100.4 degrees Farenheit (38.0 degrees Celsius), severe worsening abdominal pain not relieved by oral pain medications, heavy persistent vaginal bleeding, chest pain, palpitations, shortness of breath, dizziness, depressed mood, or for any other major concern.  You may use over the counter ibuprofen (600 mg every 6 hours) and tylenol (500-650 mg every 6 hours) as needed for pain.  You may also use stool softener (Colace/Docusate or Senna 1-2 tabs per day) as needed for constipation.  These are safe with breastfeeding.    Please call the office to schedule a routine postpartum examination in 2 and 8 weeks postpartum, or sooner if instructed so by your physician.  If you had gestational diabetes during pregnancy, then you must also schedule a 2 hour glucose test during your 8 week postpartum examination.      Postpartum Vaginal Delivery Instructions    Activity       Ask family and friends for help when you need it.    Do not place anything in your vagina for 6 weeks.    You are not restricted on other activities, but take it easy for a few weeks to allow your body to recover from delivery.  You are able to do any activities you feel up to that point.    No driving until you have stopped taking your pain medications (usually two weeks after delivery).     Call your health care provider if you have any of these symptoms:       Increased pain, swelling, redness, or fluid around your stiches from an episiotomy or perineal tear.    A fever above 100.4 F (38 C) with or without chills when placing a thermometer under your tongue.    You soak a sanitary pad with blood within 1 hour, or you see blood clots larger than a golf ball.    Bleeding that lasts more than 6 weeks.    Vaginal discharge that smells bad.    Severe pain, cramping or tenderness in your lower belly area.    A need to urinate more frequently  (use the toilet more often), more urgently (use the toilet very quickly), or it burns when you urinate.    Nausea and vomiting.    Redness, swelling or pain around a vein in your leg.    Problems breastfeeding or a red or painful area on your breast.    Chest pain and cough or are gasping for air.    Problems coping with sadness, anxiety, or depression.  If you have any concerns about hurting yourself or the baby, call your provider immediately.     You have questions or concerns after you return home.     Keep your hands clean:  Always wash your hands before touching your perineal area and stitches.  This helps reduce your risk of infection.  If your hands aren't dirty, you may use an alcohol hand-rub to clean your hands. Keep your nails clean and short.

## 2021-01-12 NOTE — PROVIDER NOTIFICATION
05/14/19 0555   Provider Notification   Provider Name/Title Dr. Navas   Method of Notification Phone   Request Evaluate - Remote   Notification Reason Status Update;Labor Status   Orders received to remove cooks catheter balloon.  Will update MD with KELLY.    
   05/14/19 0605   Provider Notification   Provider Name/Title Dr. Navas   Method of Notification Phone   Request Evaluate - Remote   Notification Reason SVE   Orders received to start Pitocin per protocol and to start IV antibiotics for positive GBS.  
   05/14/19 1424   Provider Notification   Provider Name/Title Dr MERLE Jaramillo   Method of Notification Phone   Request Evaluate - Remote   Notification Reason Status Update;SVE;Other (Comment)  (pushig effort)   Pushing effort not effective. Provider okay with laboring down.  
   05/14/19 1700   Provider Notification   Provider Name/Title Dr MERLE Jaramillo   Method of Notification At Bedside   Request Evaluate in Person   Notification Reason Fetal Baseline Change;Maternal Vital Sign Change   Orders put in for Ch[place]. Charge RN notified to update NICU  
   05/14/19 1915   Provider Notification   Provider Name/Title Dr Navas   Method of Notification In Department   Request Evaluate in Person   Notification Reason Status Update;Other  (uterus firms with massage and trickling)   Plan per provider is to give IM Methergine.  
Marifer Walsh)

## 2021-04-18 ENCOUNTER — HEALTH MAINTENANCE LETTER (OUTPATIENT)
Age: 36
End: 2021-04-18

## 2021-04-27 ENCOUNTER — THERAPY VISIT (OUTPATIENT)
Dept: PHYSICAL THERAPY | Facility: CLINIC | Age: 36
End: 2021-04-27
Payer: COMMERCIAL

## 2021-04-27 DIAGNOSIS — M99.05 PELVIC SOMATIC DYSFUNCTION: ICD-10-CM

## 2021-04-27 DIAGNOSIS — N81.6 RECTOCELE: ICD-10-CM

## 2021-04-27 PROBLEM — N39.3 SUI (STRESS URINARY INCONTINENCE, FEMALE): Status: ACTIVE | Noted: 2019-07-31

## 2021-04-27 PROCEDURE — 97112 NEUROMUSCULAR REEDUCATION: CPT | Mod: GP | Performed by: PHYSICAL THERAPIST

## 2021-04-27 PROCEDURE — 97110 THERAPEUTIC EXERCISES: CPT | Mod: GP | Performed by: PHYSICAL THERAPIST

## 2021-04-27 PROCEDURE — 97535 SELF CARE MNGMENT TRAINING: CPT | Mod: GP | Performed by: PHYSICAL THERAPIST

## 2021-04-27 PROCEDURE — 97161 PT EVAL LOW COMPLEX 20 MIN: CPT | Mod: GP | Performed by: PHYSICAL THERAPIST

## 2021-04-27 NOTE — LETTER
LORIE [address]  [facility] [address]  [phone]    2021    Re: Mallika Ojeda   :   1985  MRN:  [id number]   REFERRING PHYSICIAN:   [facility]    Date of Initial Evaluation:  21  Visits:  Rxs Used: 1  Reason for Referral:     Pelvic somatic dysfunction  Rectocele    EVALUATION SUMMARY    Physical Therapy Initial Evaluation  Subjective:  HPI  SUBJECTIVE:  Patient reports onset of symptoms of prolapse, general weakness in pelvic region, some constipation as well as some SUNNY.  Symptoms include having to splint with bowel movements, reports feels heaviness and pressure in pelvic region that can worsen towards the end of the day.  Since onset symptoms have been getting better, worse or staying the same? Same to worse after birth of her 2nd child.  Pt is , 4 months post partum delivering on 2020.    Pt saw pelvic PT after 1st pregnancy for pain and prolapse and SUNNY.  She has been working on [employer] on her own daily.  She is now feeling some prolapse symptoms and difficulty starting and fully emptying her stream.   Pertinent medical history includes: depression, overweight, incontinence and migraines/headaches.   Red flags:  Changes in bowel and bladder habits.  Medical allergies: Sulfa based.   Surgeries include:  Other (Varicose vein removal, D & C).    Current occupation is [employer].   Primary job tasks include:  Lifting/carrying and repetitive tasks.   Urination:  Do you leak on the way to the bathroom or with a strong urge to void? No    Do you leak with cough,sneeze, jumping, running?Yes   Re: Mallika Ojeda   :   1985    Any other activities that cause leaking? Passing gas, lifting toddler, sitting cross legged  Do you have triggers that make you feel you can't wait to go to the bathroom? No.  Type of pad and number used per day? 0  When you leak what is  the amount? small    How long can you delay the need to urinate? 5 min, can get painful when she feels an urge.  Hard to feel an urge until it is all of a sudden and can be painful.  Reports will wait 4 hours between voids   How many times do you get up to urinate at night? 0-1  Can you stop the flow of urine when on the toilet? Yes  Is the volume of urine passed usually: medium (reports 6-8 sec stream)  (8sec rule=  250ml with average bladder storing  400-600ml)    Do you strain to pass urine? Yes  Do you have a slow or hesitant urinary stream? Yes - especially towards the end of a stream  Do you have difficulty initiating the urine stream? No    How many bladder infections have you had in last 12 months?0    Fluid intake(one glass is 8oz or one cup) 4-6 glasses/day, 1 caffinated glasses/day  0 alcohol glasses/day.    Bowel habits:  Frequency of bowel movements?2 times a/week  Consistancy of stool? soft, soft formed, hard, [place] Stool Scale 4  Do you ignore the urge to defecate? No - rarely gets an urge  Do you strain to pass stool? Yes    Pelvic Pain:  When do you have pelvic pain? no  Is initial penetration during intercourse painful? No  Is deeper penetration painful? No  Do you use lubricant? no    Given birth? Yes Any complications?no - big babies at 10 lbs, 8 oz, 11 lbs baby.  1st delivery had 2nd degree tear, 2nd had 1 st degree tear at urethra, # of vaginal delieveries?2,   Are you sexually active?Yes  Have you ever been worried for your physical safety? No  Any abdominal or pelvic surgeries? D&C  Are you having any regular exercise?no  Have you practiced the PF(kegel) exercises for 4 or more weeks?no                            Objective:  Standing Alignment:    Re: Mallika Ojeda   :   1985    Lumbar:  Anterior pelvic tilt     Lumbar/SI Evaluation  ROM:  AROM Lumbar: normal        Pelvic Dysfunction Evaluation:    Flexibility:    Tightness present at:Piriformis and Gluteals  Abdominal Wall:   Abdominal wall pelvic: when asked to activate her abdominals, note she uses upper abs and obliques.  Diastasis Recti:  Negative  Pelvic Clock Exam:    Ischiocavernosis pain:  -  Bulbocavernosis pain:  -  Transverse Perineal:  -  Levator ANI:  +  Perineal Body:  -    External Assessment:  External assessment pelvic: grade 1-2 supine strain noted.  Skin Condition:  Normal  Scars:  Well healed  Bearing Down/Coughing:  Cystocele and rectocele  Tissue Symmetry:  Normal  Introitus:  Normal  Muscle Contraction/Perineal Mobility:  Slight lift, no urogential triangle descent and substitution  Internal Assessment:      Contraction/Grade:  Fllicker muscles stretched (1)  Accessory Muscle use-Abdominals:  Min  Accessory Muscle use-Adductors:  Min    SEMG Biofeedback:    Equipment:  [place] electrode placement--Perianal:  Yes  Baseline EMG PM:  1.2 uV  supine.  in sitting resting tone 0-1 uV.   Sustained contraction:  Endurance hold average 3.6 uV,  quick flicks 2.7 uV  EMG interpretation to fatigue:  3-5 seconds  Position:  Sitting        General   ROS    Assessment/Plan:    Patient is a 35 year old female with pelvic complaints.    Patient has the following significant findings with corresponding treatment plan.                Diagnosis 1:  Post partum pelvic floor/POP  Pain -  manual therapy, self management, education and home program  Decreased ROM/flexibility - manual therapy, therapeutic exercise, therapeutic activity and home program  Decreased strength - therapeutic exercise, therapeutic activities and home program  Impaired muscle performance - biofeedback, neuro re-education and home program  Decreased function - therapeutic activities and home program  Impaired posture - neuro re-education, therapeutic activities and home program    Therapy Evaluation Codes:   1) History comprised of:   Personal factors that impact the plan of care:      None.    Comorbidity factors that impact the plan of care are:       Bowel/bladder changes, Depression, Migraines/headaches and             Overweight.     Medications impacting care: None.  2) Examination of Body Systems comprised of:   Body structures and functions that impact the plan of care:      Pelvis.   Activity limitations that impact the plan of care are:      Stress incontinence and prolapse.  3) Clinical presentation characteristics are:   Stable/Uncomplicated.  4) Decision-Making    Low complexity using standardized patient assessment instrument and/or measureable assessment of functional outcome.  Cumulative Therapy Evaluation is: Low complexity.    Communication ability:  Patient appears to be able to clearly communicate and understand verbal and written communication and follow directions correctly.  Treatment Explanation - The following has been discussed with the patient:   RX ordered/plan of care  Anticipated outcomes  Possible risks and side effects  This patient would benefit from PT intervention to resume normal activities.   Rehab potential is good.  Frequency:  1  X week, once daily  Duration:  for 6 weeks  Discharge Plan:  Achieve all LTG.  Independent in home treatment program.  Reach maximal therapeutic benefit.    Thank you for your referral.    INQUIRIES  Therapist: Ermias Ruffin DPT   [address] [address]  Phone: [phone]  Fax: [phone]

## 2021-04-27 NOTE — PROGRESS NOTES
Physical Therapy Initial Evaluation  Subjective:  HPI  SUBJECTIVE:  Patient reports onset of symptoms of prolapse, general weakness in pelvic region, some constipation as well as some SUNNY.  Symptoms include having to splint with bowel movements, reports feels heaviness and pressure in pelvic region that can worsen towards the end of the day.  Since onset symptoms have been getting better, worse or staying the same? Same to worse after birth of her 2nd child.  Pt is , 4 months post partum delivering on 2020.    Pt saw pelvic PT after 1st pregnancy for pain and prolapse and SUNNY.  She has been working on [employer] on her own daily.  She is now feeling some prolapse symptoms and difficulty starting and fully emptying her stream.   Urination:  Do you leak on the way to the bathroom or with a strong urge to void? No    Do you leak with cough,sneeze, jumping, running?Yes   Any other activities that cause leaking? Passing gas, lifting toddler, sitting cross legged  Do you have triggers that make you feel you can't wait to go to the bathroom? No.  Type of pad and number used per day? 0  When you leak what is the amount? small    How long can you delay the need to urinate? 5 min, can get painful when she feels an urge.  Hard to feel an urge until it is all of a sudden and can be painful.  Reports will wait 4 hours between voids   How many times do you get up to urinate at night? 0-1  Can you stop the flow of urine when on the toilet? Yes  Is the volume of urine passed usually: medium (reports 6-8 sec stream)  (8sec rule=  250ml with average bladder storing  400-600ml)    Do you strain to pass urine? Yes  Do you have a slow or hesitant urinary stream? Yes - especially towards the end of a stream  Do you have difficulty initiating the urine stream? No    How many bladder infections have you had in last 12 months?0    Fluid intake(one glass is 8oz or one cup) 4-6 glasses/day, 1 caffinated glasses/day  0 alcohol  glasses/day.    Bowel habits:  Frequency of bowel movements?2 times a/week  Consistancy of stool? soft, soft formed, hard, [place] Stool Scale 4  Do you ignore the urge to defecate? No - rarely gets an urge  Do you strain to pass stool? Yes    Pelvic Pain:  When do you have pelvic pain? no  Is initial penetration during intercourse painful? No  Is deeper penetration painful? No  Do you use lubricant? no      Given birth? Yes Any complications?no - big babies at 10 lbs, 8 oz, 11 lbs baby.  1st delivery had 2nd degree tear, 2nd had 1 st degree tear at urethra, # of vaginal delieveries?2,   Are you sexually active?Yes  Have you ever been worried for your physical safety? No  Any abdominal or pelvic surgeries? D&C  Are you having any regular exercise?no  Have you practiced the PF(kegel) exercises for 4 or more weeks?no                               Objective:  Standing Alignment:        Lumbar:  Anterior pelvic tilt                           Lumbar/SI Evaluation  ROM:  AROM Lumbar: normal                                                Pelvic Dysfunction Evaluation:        Flexibility:    Tightness present at:Piriformis and Gluteals    Abdominal Wall:  Abdominal wall pelvic: when asked to activate her abdominals, note she uses upper abs and obliques.  Diastasis Recti:  Negative      Pelvic Clock Exam:    Ischiocavernosis pain:  -  Bulbocavernosis pain:  -  Transverse Perineal:  -  Levator ANI:  +  Perineal Body:  -      External Assessment:  External assessment pelvic: grade 1-2 supine strain noted.  Skin Condition:  Normal  Scars:  Well healed  Bearing Down/Coughing:  Cystocele and rectocele  Tissue Symmetry:  Normal  Introitus:  Normal  Muscle Contraction/Perineal Mobility:  Slight lift, no urogential triangle descent and substitution  Internal Assessment:      Contraction/Grade:  Fllicker muscles stretched (1)  Accessory Muscle use-Abdominals:  Min    Accessory Muscle use-Adductors:  Min    SEMG Biofeedback:     Equipment:  MR20    [place] electrode placement--Perianal:  Yes  Baseline EMG PM:  1.2 uV  supine.  in sitting resting tone 0-1 uV.       Sustained contraction:  Endurance hold average 3.6 uV,  quick flicks 2.7 uV  EMG interpretation to fatigue:  3-5 seconds  Position:  Sitting                     General     ROS    Assessment/Plan:    Patient is a 35 year old female with pelvic complaints.    Patient has the following significant findings with corresponding treatment plan.                Diagnosis 1:  Post partum pelvic floor/POP  Pain -  manual therapy, self management, education and home program  Decreased ROM/flexibility - manual therapy, therapeutic exercise, therapeutic activity and home program  Decreased strength - therapeutic exercise, therapeutic activities and home program  Impaired muscle performance - biofeedback, neuro re-education and home program  Decreased function - therapeutic activities and home program  Impaired posture - neuro re-education, therapeutic activities and home program    Therapy Evaluation Codes:   1) History comprised of:   Personal factors that impact the plan of care:      None.    Comorbidity factors that impact the plan of care are:      Bowel/bladder changes, Depression, Migraines/headaches and Overweight.     Medications impacting care: None.  2) Examination of Body Systems comprised of:   Body structures and functions that impact the plan of care:      Pelvis.   Activity limitations that impact the plan of care are:      Stress incontinence and prolapse.  3) Clinical presentation characteristics are:   Stable/Uncomplicated.  4) Decision-Making    Low complexity using standardized patient assessment instrument and/or measureable assessment of functional outcome.  Cumulative Therapy Evaluation is: Low complexity.    Previous and current functional limitations:  (See Goal Flow Sheet for this information)    Short term and Long term goals: (See Goal Flow Sheet for this  information)     Communication ability:  Patient appears to be able to clearly communicate and understand verbal and written communication and follow directions correctly.  Treatment Explanation - The following has been discussed with the patient:   RX ordered/plan of care  Anticipated outcomes  Possible risks and side effects  This patient would benefit from PT intervention to resume normal activities.   Rehab potential is good.    Frequency:  1  X week, once daily  Duration:  for 6 weeks  Discharge Plan:  Achieve all LTG.  Independent in home treatment program.  Reach maximal therapeutic benefit.    Please refer to the daily flowsheet for treatment today, total treatment time and time spent performing 1:1 timed codes.

## 2021-04-28 NOTE — PROGRESS NOTES
Physical Therapy Initial Evaluation  Subjective:    Patient Health History             Pertinent medical history includes: depression, overweight, incontinence and migraines/headaches.   Red flags:  Changes in bowel and bladder habits.  Medical allergies: Sulfa based.   Surgeries include:  Other (Varicose vein removal, D & C).        Current occupation is [employer].   Primary job tasks include:  Lifting/carrying and repetitive tasks.                                    Objective:  System    Physical Exam    General     ROS    Assessment/Plan:

## 2021-05-21 ENCOUNTER — THERAPY VISIT (OUTPATIENT)
Dept: PHYSICAL THERAPY | Facility: CLINIC | Age: 36
End: 2021-05-21
Payer: COMMERCIAL

## 2021-05-21 DIAGNOSIS — M99.05 PELVIC SOMATIC DYSFUNCTION: ICD-10-CM

## 2021-05-21 DIAGNOSIS — N81.6 RECTOCELE: ICD-10-CM

## 2021-05-21 DIAGNOSIS — N39.3 SUI (STRESS URINARY INCONTINENCE, FEMALE): ICD-10-CM

## 2021-05-21 PROCEDURE — 97110 THERAPEUTIC EXERCISES: CPT | Performed by: PHYSICAL THERAPIST

## 2021-05-21 PROCEDURE — 97112 NEUROMUSCULAR REEDUCATION: CPT | Performed by: PHYSICAL THERAPIST

## 2021-06-04 ENCOUNTER — THERAPY VISIT (OUTPATIENT)
Dept: PHYSICAL THERAPY | Facility: CLINIC | Age: 36
End: 2021-06-04
Payer: COMMERCIAL

## 2021-06-04 DIAGNOSIS — N39.3 SUI (STRESS URINARY INCONTINENCE, FEMALE): ICD-10-CM

## 2021-06-04 DIAGNOSIS — M99.05 PELVIC SOMATIC DYSFUNCTION: ICD-10-CM

## 2021-06-04 DIAGNOSIS — N81.6 RECTOCELE: ICD-10-CM

## 2021-06-04 PROCEDURE — 97110 THERAPEUTIC EXERCISES: CPT | Mod: GP | Performed by: PHYSICAL THERAPIST

## 2021-06-13 ENCOUNTER — OFFICE VISIT (OUTPATIENT)
Dept: URGENT CARE | Facility: URGENT CARE | Age: 36
End: 2021-06-13
Payer: COMMERCIAL

## 2021-06-13 VITALS
SYSTOLIC BLOOD PRESSURE: 115 MMHG | HEART RATE: 85 BPM | DIASTOLIC BLOOD PRESSURE: 80 MMHG | WEIGHT: 220 LBS | OXYGEN SATURATION: 100 % | BODY MASS INDEX: 34.46 KG/M2 | TEMPERATURE: 99.2 F

## 2021-06-13 DIAGNOSIS — L03.116 CELLULITIS OF LEFT LOWER EXTREMITY: Primary | ICD-10-CM

## 2021-06-13 PROCEDURE — 99203 OFFICE O/P NEW LOW 30 MIN: CPT | Performed by: FAMILY MEDICINE

## 2021-06-14 NOTE — PROGRESS NOTES
Assessment & Plan     Cellulitis of left lower extremity    - amoxicillin-clavulanate (AUGMENTIN) 875-125 MG tablet; Take 1 tablet by mouth 2 times daily for 10 days    LEFT heel localized cellulitis.  Continue with hot packs to area.  Keep elevated as much as possible.  Treat with Augmentin.  Close Follow-up if no change or worsening sx-- area outlined today.    20 minutes spent on the date of the encounter doing chart review, patient visit and documentation     Aleena Chen MD  [facility]   Mallika is a 35 year old who presents for the following health issues     HPI   Had blister from shoe along LEFT posterior heel-- this popped and became infected.  Now with pain and increased redness around site of blister.    Breastfeeding five month old-- had mastitis a few weeks ago.        Review of Systems   Constitutional, HEENT, cardiovascular, pulmonary, GI, , musculoskeletal, neuro, skin, endocrine and psych systems are negative, except as otherwise noted.      Objective    /80   Pulse 85   Temp 99.2  F (37.3  C) (Tympanic)   Wt 99.8 kg (220 lb)   SpO2 100%   BMI 34.46 kg/m    Body mass index is 34.46 kg/m .  Physical Exam   GENERAL: healthy, alert and no distress  EYES: Eyes grossly normal to inspection, PERRL and conjunctivae and sclerae normal  NECK: no adenopathy, no asymmetry, masses, or scars and thyroid normal to palpation  MS: no gross musculoskeletal defects noted, no edema  SKIN: Area of localized redness around posterior LEFT heel where blister popped-- tender to palp, area outlined with black marker today  NEURO: Normal strength and tone, mentation intact and speech normal  PSYCH: mentation appears normal, affect normal/bright

## 2021-06-18 ENCOUNTER — THERAPY VISIT (OUTPATIENT)
Dept: PHYSICAL THERAPY | Facility: CLINIC | Age: 36
End: 2021-06-18
Payer: COMMERCIAL

## 2021-06-18 DIAGNOSIS — N81.6 RECTOCELE: ICD-10-CM

## 2021-06-18 DIAGNOSIS — M99.05 PELVIC SOMATIC DYSFUNCTION: ICD-10-CM

## 2021-06-18 DIAGNOSIS — N39.3 SUI (STRESS URINARY INCONTINENCE, FEMALE): ICD-10-CM

## 2021-06-18 PROCEDURE — 97110 THERAPEUTIC EXERCISES: CPT | Mod: GP | Performed by: PHYSICAL THERAPIST

## 2021-10-02 ENCOUNTER — HEALTH MAINTENANCE LETTER (OUTPATIENT)
Age: 36
End: 2021-10-02

## 2021-11-20 PROBLEM — N81.6 RECTOCELE: Status: RESOLVED | Noted: 2021-04-27 | Resolved: 2021-11-20

## 2021-11-20 PROBLEM — M99.05 PELVIC SOMATIC DYSFUNCTION: Status: RESOLVED | Noted: 2021-04-27 | Resolved: 2021-11-20

## 2021-11-20 PROBLEM — N39.3 SUI (STRESS URINARY INCONTINENCE, FEMALE): Status: RESOLVED | Noted: 2019-07-31 | Resolved: 2021-11-20

## 2021-11-21 NOTE — PROGRESS NOTES
DISCHARGE REPORT    Mallika did not return for further treatment after the last visit on 6/18/21.   Current status is unknown.  Please see information below for last known relevant information.  Patient seen for 4 visits.    SUBJECTIVE  Subjective changes noted by patient:  heaviness feeling is less overall.  no longer in the morning, happens towards end of the day will feel heaviness some days.  symptoms are more intermittent  .  Current pain level is 0/10.     Previous pain level was  0/10.   Changes in function: (See Goal flowsheet attached for changes in current functional level)  Adverse reaction to treatment or activity: None    OBJECTIVE  Changes noted in objective findings:   improved TrA activation in supine.  continues to tolerate progression to standing functional exercises.  fatigues with reps in standing     ASSESSMENT/PLAN  Diagnosis: pelvic floor post partum/POP   Updated problem list and treatment plan:  Patient will continue to utilize HEP for any remaining deficits.   STG/LTGs have been met or progress has been made towards goals:  Please see goal flowsheet for most current information  Assessment of Progress: current status is unknown.    Last current status: Pt is progressing as expected   Self Management Plans:  HEP  I have re-evaluated this patient and find that the nature, scope, duration and intensity of the therapy is appropriate for the medical condition of the patient.  Mallika continues to require the following intervention to meet STG and LTG's:  HEP.    Recommendations:  Discharge with current home program.  Patient to follow up with MD as needed.    Please refer to the daily flowsheet for treatment today, total treatment time and time spent performing 1:1 timed codes.

## 2022-05-14 ENCOUNTER — HEALTH MAINTENANCE LETTER (OUTPATIENT)
Age: 37
End: 2022-05-14

## 2022-09-03 ENCOUNTER — HEALTH MAINTENANCE LETTER (OUTPATIENT)
Age: 37
End: 2022-09-03

## 2023-06-03 ENCOUNTER — HEALTH MAINTENANCE LETTER (OUTPATIENT)
Age: 38
End: 2023-06-03